# Patient Record
Sex: FEMALE | Race: WHITE | NOT HISPANIC OR LATINO | Employment: FULL TIME
[De-identification: names, ages, dates, MRNs, and addresses within clinical notes are randomized per-mention and may not be internally consistent; named-entity substitution may affect disease eponyms.]

---

## 2020-11-29 ENCOUNTER — HEALTH MAINTENANCE LETTER (OUTPATIENT)
Age: 35
End: 2020-11-29

## 2021-07-16 ENCOUNTER — RECORDS - HEALTHEAST (OUTPATIENT)
Dept: ADMINISTRATIVE | Facility: CLINIC | Age: 36
End: 2021-07-16

## 2021-09-01 ENCOUNTER — LAB REQUISITION (OUTPATIENT)
Dept: LAB | Facility: CLINIC | Age: 36
End: 2021-09-01

## 2021-09-01 PROCEDURE — 86481 TB AG RESPONSE T-CELL SUSP: CPT | Performed by: INTERNAL MEDICINE

## 2021-09-03 LAB
GAMMA INTERFERON BACKGROUND BLD IA-ACNC: 0.03 IU/ML
M TB IFN-G BLD-IMP: NEGATIVE
M TB IFN-G CD4+ BCKGRND COR BLD-ACNC: 9.97 IU/ML
MITOGEN IGNF BCKGRD COR BLD-ACNC: 0.01 IU/ML
MITOGEN IGNF BCKGRD COR BLD-ACNC: 0.02 IU/ML
QUANTIFERON MITOGEN: 10 IU/ML
QUANTIFERON NIL TUBE: 0.03 IU/ML
QUANTIFERON TB1 TUBE: 0.05 IU/ML
QUANTIFERON TB2 TUBE: 0.04

## 2021-09-25 ENCOUNTER — HEALTH MAINTENANCE LETTER (OUTPATIENT)
Age: 36
End: 2021-09-25

## 2021-11-28 ENCOUNTER — APPOINTMENT (OUTPATIENT)
Dept: URGENT CARE | Facility: URGENT CARE | Age: 36
End: 2021-11-28

## 2021-11-28 ENCOUNTER — LAB REQUISITION (OUTPATIENT)
Dept: LAB | Facility: CLINIC | Age: 36
End: 2021-11-28

## 2021-11-28 PROCEDURE — U0003 INFECTIOUS AGENT DETECTION BY NUCLEIC ACID (DNA OR RNA); SEVERE ACUTE RESPIRATORY SYNDROME CORONAVIRUS 2 (SARS-COV-2) (CORONAVIRUS DISEASE [COVID-19]), AMPLIFIED PROBE TECHNIQUE, MAKING USE OF HIGH THROUGHPUT TECHNOLOGIES AS DESCRIBED BY CMS-2020-01-R: HCPCS | Performed by: INTERNAL MEDICINE

## 2021-11-29 LAB — SARS-COV-2 RNA RESP QL NAA+PROBE: NEGATIVE

## 2022-01-15 ENCOUNTER — HEALTH MAINTENANCE LETTER (OUTPATIENT)
Age: 37
End: 2022-01-15

## 2022-08-11 PROBLEM — Z97.5 IUD (INTRAUTERINE DEVICE) IN PLACE: Status: ACTIVE | Noted: 2017-08-28

## 2022-08-11 PROBLEM — H91.90 HEARING LOSS: Status: ACTIVE | Noted: 2019-05-09

## 2022-09-19 ENCOUNTER — OFFICE VISIT (OUTPATIENT)
Dept: OBGYN | Facility: CLINIC | Age: 37
End: 2022-09-19
Payer: COMMERCIAL

## 2022-09-19 VITALS
SYSTOLIC BLOOD PRESSURE: 118 MMHG | DIASTOLIC BLOOD PRESSURE: 72 MMHG | BODY MASS INDEX: 33.19 KG/M2 | WEIGHT: 199.2 LBS | HEIGHT: 65 IN

## 2022-09-19 DIAGNOSIS — E66.09 CLASS 1 OBESITY DUE TO EXCESS CALORIES WITHOUT SERIOUS COMORBIDITY WITH BODY MASS INDEX (BMI) OF 33.0 TO 33.9 IN ADULT: ICD-10-CM

## 2022-09-19 DIAGNOSIS — F41.9 ANXIETY: ICD-10-CM

## 2022-09-19 DIAGNOSIS — Z30.432 ENCOUNTER FOR IUD REMOVAL: ICD-10-CM

## 2022-09-19 DIAGNOSIS — Z01.419 ENCOUNTER FOR GYNECOLOGICAL EXAMINATION (GENERAL) (ROUTINE) WITHOUT ABNORMAL FINDINGS: Primary | ICD-10-CM

## 2022-09-19 DIAGNOSIS — E66.811 CLASS 1 OBESITY DUE TO EXCESS CALORIES WITHOUT SERIOUS COMORBIDITY WITH BODY MASS INDEX (BMI) OF 33.0 TO 33.9 IN ADULT: ICD-10-CM

## 2022-09-19 DIAGNOSIS — M54.9 BACK PAIN, UNSPECIFIED BACK LOCATION, UNSPECIFIED BACK PAIN LATERALITY, UNSPECIFIED CHRONICITY: ICD-10-CM

## 2022-09-19 LAB
CLUE CELLS: NORMAL
ERYTHROCYTE [DISTWIDTH] IN BLOOD BY AUTOMATED COUNT: 12.4 % (ref 10–15)
HCT VFR BLD AUTO: 42.5 % (ref 35–47)
HGB BLD-MCNC: 14.5 G/DL (ref 11.7–15.7)
MCH RBC QN AUTO: 29.7 PG (ref 26.5–33)
MCHC RBC AUTO-ENTMCNC: 34.1 G/DL (ref 31.5–36.5)
MCV RBC AUTO: 87 FL (ref 78–100)
PLATELET # BLD AUTO: 267 10E3/UL (ref 150–450)
RBC # BLD AUTO: 4.88 10E6/UL (ref 3.8–5.2)
TRICHOMONAS, WET PREP: NORMAL
WBC # BLD AUTO: 7.1 10E3/UL (ref 4–11)
WBC'S/HIGH POWER FIELD, WET PREP: NORMAL
YEAST, WET PREP: NORMAL

## 2022-09-19 PROCEDURE — 85027 COMPLETE CBC AUTOMATED: CPT | Performed by: FAMILY MEDICINE

## 2022-09-19 PROCEDURE — 99385 PREV VISIT NEW AGE 18-39: CPT | Mod: 25 | Performed by: FAMILY MEDICINE

## 2022-09-19 PROCEDURE — 80053 COMPREHEN METABOLIC PANEL: CPT | Performed by: FAMILY MEDICINE

## 2022-09-19 PROCEDURE — 36415 COLL VENOUS BLD VENIPUNCTURE: CPT | Performed by: FAMILY MEDICINE

## 2022-09-19 PROCEDURE — 84443 ASSAY THYROID STIM HORMONE: CPT | Performed by: FAMILY MEDICINE

## 2022-09-19 PROCEDURE — 87624 HPV HI-RISK TYP POOLED RSLT: CPT | Performed by: FAMILY MEDICINE

## 2022-09-19 PROCEDURE — 58301 REMOVE INTRAUTERINE DEVICE: CPT | Performed by: FAMILY MEDICINE

## 2022-09-19 PROCEDURE — G0145 SCR C/V CYTO,THINLAYER,RESCR: HCPCS | Performed by: FAMILY MEDICINE

## 2022-09-19 PROCEDURE — 87210 SMEAR WET MOUNT SALINE/INK: CPT | Performed by: FAMILY MEDICINE

## 2022-09-19 PROCEDURE — 87591 N.GONORRHOEAE DNA AMP PROB: CPT | Performed by: FAMILY MEDICINE

## 2022-09-19 PROCEDURE — 87491 CHLMYD TRACH DNA AMP PROBE: CPT | Performed by: FAMILY MEDICINE

## 2022-09-19 PROCEDURE — 80061 LIPID PANEL: CPT | Performed by: FAMILY MEDICINE

## 2022-09-19 RX ORDER — GABAPENTIN 300 MG/1
300 CAPSULE ORAL
COMMUNITY
Start: 2020-05-01 | End: 2022-09-19

## 2022-09-19 RX ORDER — PHENTERMINE HYDROCHLORIDE 37.5 MG/1
37.5 TABLET ORAL
Qty: 90 TABLET | Refills: 0 | Status: SHIPPED | OUTPATIENT
Start: 2022-09-19 | End: 2023-02-23

## 2022-09-19 RX ORDER — GABAPENTIN 300 MG/1
300 CAPSULE ORAL
Qty: 30 CAPSULE | Refills: 1 | Status: SHIPPED | OUTPATIENT
Start: 2022-09-19

## 2022-09-19 RX ORDER — HYDROXYZINE HYDROCHLORIDE 25 MG/1
25 TABLET, FILM COATED ORAL 2 TIMES DAILY PRN
Qty: 30 TABLET | Refills: 1 | Status: SHIPPED | OUTPATIENT
Start: 2022-09-19

## 2022-09-19 RX ORDER — ZOLPIDEM TARTRATE 5 MG/1
5 TABLET ORAL
Qty: 30 TABLET | Refills: 0 | Status: SHIPPED | OUTPATIENT
Start: 2022-09-19 | End: 2022-11-17

## 2022-09-19 RX ORDER — HYDROXYZINE HYDROCHLORIDE 25 MG/1
TABLET, FILM COATED ORAL
COMMUNITY
Start: 2020-05-01 | End: 2022-09-19

## 2022-09-19 NOTE — PROGRESS NOTES
"SUBJECTIVE:  Rosaura Rosales is an 37 year old No obstetric history on file. woman who presents for   annual gyn exam. No LMP recorded. (Menstrual status: IUD). Periods are none due to IUD, lasting   no days. Dysmenorrhea:none. Cyclic symptoms   include none. No intermenstrual bleeding,   spotting, or discharge.  Menarche age [age].  STD hx: Chlamydia .    Current contraception: Mirena IUD  TERE exposure: no  History of abnormal Pap smear: Yes: now normal,   Treated with colposcopy.    Family history of uterine or ovarian cancer: No  Regular self breast exam: Yes  History of abnormal mammogram: No  Family history of breast cancer: Yes: PGM   History of abnormal lipids: No    No past medical history on file.     No family history on file.    No past surgical history on file.    Current Outpatient Medications   Medication     gabapentin (NEURONTIN) 300 MG capsule     hydrOXYzine (ATARAX) 25 MG tablet     levonorgestrel (MIRENA) 20 MCG/DAY IUD     zolpidem (AMBIEN) 5 MG tablet     No current facility-administered medications for this visit.     No Known Allergies    Social History     Tobacco Use     Smoking status: Never Smoker     Smokeless tobacco: Never Used   Substance Use Topics     Alcohol use: Not on file       Review Of Systems  Ears/Nose/Throat: negative  Respiratory: No shortness of breath, dyspnea on exertion, cough, or hemoptysis  Cardiovascular: negative  Gastrointestinal: negative  Genitourinary: See HPI   Constitutional, HEENT, cardiovascular, pulmonary, GI, , musculoskeletal, neuro, skin, endocrine and psych systems are negative, except as otherwise noted.      OBJECTIVE:  /72   Ht 1.651 m (5' 5\")   Wt 90.4 kg (199 lb 3.2 oz)   BMI 33.15 kg/m      General appearance: healthy, alert and no distress  Skin: Skin color, texture, turgor normal. No rashes or lesions.  Ears: negative  Nose/Sinuses: Nares normal. Septum midline. Mucosa normal. No drainage or sinus tenderness.  Oropharynx: Lips, " mucosa, and tongue normal. Teeth and gums normal.  Neck: Neck supple. No adenopathy. Thyroid symmetric, normal size,, Carotids without bruits.  Lungs: negative, Percussion normal. Good diaphragmatic excursion. Lungs clear  Heart: negative, PMI normal. No lifts, heaves, or thrills. RRR. No murmurs, clicks gallops or rub  Breasts: Inspection negative. No nipple discharge or bleeding. No masses.  Abdomen: Abdomen soft, non-tender. BS normal. No masses, organomegaly  Pelvic: Pelvic:  Pelvic examination with  Pap/yes wet prep yes Gonorrhea and Chlamydia   including  External genitalia normal   and vagina normal rugatted no atrophic  Examination of urethra  normal no masses, tenderness, scarring  bladder, no masses or tenderness  Cervix  no lesions or discharge  Bimanual exam with   Uterus 6 weeks size, mid position, mobile,non-tenderness, normal no descent   Adnexa/parametria  Normal no masses     Procedure:  Strings grasped with ring forceps and IUD easily removed, intact.   Patient tolerated well, no complications.          ASSESSMENT:  Rosaura Rosales is an 37 year old  woman who presents for   annual gyn exam. Concerns:  Desires IUD removal    PLAN:  Dx:  1)  Pap smear complete  Gonorrhea  Chlamydia completed   2)  Mammography not due , lipids at appropriate intervals today   Colonoscopy due age 46   3)  Covid-19 concerns: covid infection and vaccination recommendations discussed.   4)  Contraception: IUD removal, partner with vasectomy  5)  Weight gain, Class 1 Obesity: referral to weight management center  Would like to start phentermine in the meantime.       PE:  Reviewed health maintenance including diet, regular exercise   and periodic exams.    Dr. Mayte Atwood, DO    Obstetrics and Gynecology  [facility] [place]

## 2022-09-19 NOTE — NURSING NOTE
"Chief Complaint   Patient presents with     IUD     Remove Mirena IUD       Initial /72   Ht 1.651 m (5' 5\")   Wt 90.4 kg (199 lb 3.2 oz)   BMI 33.15 kg/m   Estimated body mass index is 33.15 kg/m  as calculated from the following:    Height as of this encounter: 1.651 m (5' 5\").    Weight as of this encounter: 90.4 kg (199 lb 3.2 oz).  BP completed using cuff size: large    Questioned patient about current smoking habits.  Pt. has never smoked.      No obstetric history on file.    The following HM Due: pap smear  Edwin (13-24)      "

## 2022-09-19 NOTE — PATIENT INSTRUCTIONS
Return yearly     Dr. Mayte Atwood, DO    Obstetrics and Gynecology  [facility] [place] [facility] [place]

## 2022-09-20 LAB
ALBUMIN SERPL-MCNC: 4.1 G/DL (ref 3.4–5)
ALP SERPL-CCNC: 52 U/L (ref 40–150)
ALT SERPL W P-5'-P-CCNC: 37 U/L (ref 0–50)
ANION GAP SERPL CALCULATED.3IONS-SCNC: 7 MMOL/L (ref 3–14)
AST SERPL W P-5'-P-CCNC: 14 U/L (ref 0–45)
BILIRUB SERPL-MCNC: 0.5 MG/DL (ref 0.2–1.3)
BUN SERPL-MCNC: 12 MG/DL (ref 7–30)
C TRACH DNA SPEC QL NAA+PROBE: NEGATIVE
CALCIUM SERPL-MCNC: 8.9 MG/DL (ref 8.5–10.1)
CHLORIDE BLD-SCNC: 105 MMOL/L (ref 94–109)
CHOLEST SERPL-MCNC: 237 MG/DL
CO2 SERPL-SCNC: 24 MMOL/L (ref 20–32)
CREAT SERPL-MCNC: 0.73 MG/DL (ref 0.52–1.04)
FASTING STATUS PATIENT QL REPORTED: YES
GFR SERPL CREATININE-BSD FRML MDRD: >90 ML/MIN/1.73M2
GLUCOSE BLD-MCNC: 88 MG/DL (ref 70–99)
HDLC SERPL-MCNC: 48 MG/DL
LDLC SERPL CALC-MCNC: 142 MG/DL
N GONORRHOEA DNA SPEC QL NAA+PROBE: NEGATIVE
NONHDLC SERPL-MCNC: 189 MG/DL
POTASSIUM BLD-SCNC: 4 MMOL/L (ref 3.4–5.3)
PROT SERPL-MCNC: 7.6 G/DL (ref 6.8–8.8)
SODIUM SERPL-SCNC: 136 MMOL/L (ref 133–144)
TRIGL SERPL-MCNC: 235 MG/DL
TSH SERPL DL<=0.005 MIU/L-ACNC: 1.88 MU/L (ref 0.4–4)

## 2022-09-21 LAB
BKR LAB AP GYN ADEQUACY: NORMAL
BKR LAB AP GYN INTERPRETATION: NORMAL
BKR LAB AP GYN OTHER FINDINGS: NORMAL
BKR LAB AP HPV REFLEX: NORMAL
BKR LAB AP PREVIOUS ABNORMAL: NORMAL
PATH REPORT.COMMENTS IMP SPEC: NORMAL
PATH REPORT.COMMENTS IMP SPEC: NORMAL
PATH REPORT.RELEVANT HX SPEC: NORMAL

## 2022-09-23 LAB
HUMAN PAPILLOMA VIRUS 16 DNA: NEGATIVE
HUMAN PAPILLOMA VIRUS 18 DNA: NEGATIVE
HUMAN PAPILLOMA VIRUS FINAL DIAGNOSIS: NORMAL
HUMAN PAPILLOMA VIRUS OTHER HR: NEGATIVE

## 2022-11-10 DIAGNOSIS — F41.9 ANXIETY: ICD-10-CM

## 2022-11-10 NOTE — TELEPHONE ENCOUNTER
Routing refill request to provider for review/approval because:  Drug not on the [facility] refill protocol   Trinity Hutchinson RN

## 2022-11-17 RX ORDER — ZOLPIDEM TARTRATE 5 MG/1
TABLET ORAL
Qty: 30 TABLET | Refills: 0 | Status: SHIPPED | OUTPATIENT
Start: 2022-11-17

## 2022-11-18 ENCOUNTER — TELEPHONE (OUTPATIENT)
Dept: OBGYN | Facility: CLINIC | Age: 37
End: 2022-11-18

## 2022-11-18 DIAGNOSIS — F41.9 ANXIETY: ICD-10-CM

## 2022-11-18 RX ORDER — ZOLPIDEM TARTRATE 5 MG/1
5 TABLET ORAL
Qty: 30 TABLET | Refills: 0 | Status: CANCELLED | OUTPATIENT
Start: 2022-11-18

## 2022-11-18 NOTE — TELEPHONE ENCOUNTER
Hello,     Insurance only will pay for #15 tablets per 23 days. Please contact insurance to get a prior authorization for one daily.    Please do not close this encounter until this has been addressed.  (prior auth approved/denied, prescriber refusal to complete prior auth or medication changed/discontinued)    Prior Authorization needed on: Zolpidem 5mg  Drug NDC: [address]-60    Insurance: [facility]  Member ID: [id number]  Insurance phone #: [phone]    Pharmacy NPI:[id number]  Pharmacy Phone #: [phone]  Pharmacy Fax #: [phone]    Please let us know if the PA gets approved or denied or if medication is changed    Thank you,  [facility] Staff Technician   [facility]  [email or web address]   #: [phone]  Fax#: [phone]

## 2022-11-21 NOTE — TELEPHONE ENCOUNTER
Central Prior Authorization Team   Phone: [phone]      PA Initiation    Medication: Zolpidem  Insurance Company: [employer] - Phone [phone] Fax [phone]  Pharmacy Filling the Rx: [place], [place] - [address]  Filling Pharmacy Phone: [phone]  Filling Pharmacy Fax:    Start Date: 11/21/2022

## 2022-11-21 NOTE — TELEPHONE ENCOUNTER
Prior Authorization Approval    Authorization Effective Date: 11/21/2022  Authorization Expiration Date: 11/21/2023  Medication: Zolpidem-APPROVED  Approved Dose/Quantity:   Reference #:     Insurance Company: [employer] - Phone [phone] Fax [phone]  Expected CoPay:       CoPay Card Available:      Foundation Assistance Needed:    Which Pharmacy is filling the prescription (Not needed for infusion/clinic administered): [facility] [place], [place] - [address] [facility] [address]  Pharmacy Notified: Yes  Patient Notified: No  **Instructed pharmacy to notify patient when script is ready to /ship.**

## 2022-12-13 ENCOUNTER — E-VISIT (OUTPATIENT)
Dept: URGENT CARE | Facility: CLINIC | Age: 37
End: 2022-12-13
Payer: COMMERCIAL

## 2022-12-13 DIAGNOSIS — N39.0 ACUTE UTI (URINARY TRACT INFECTION): Primary | ICD-10-CM

## 2022-12-13 PROCEDURE — 99421 OL DIG E/M SVC 5-10 MIN: CPT | Performed by: EMERGENCY MEDICINE

## 2022-12-13 RX ORDER — NITROFURANTOIN 25; 75 MG/1; MG/1
100 CAPSULE ORAL 2 TIMES DAILY
Qty: 10 CAPSULE | Refills: 0 | Status: SHIPPED | OUTPATIENT
Start: 2022-12-13 | End: 2022-12-18

## 2022-12-13 NOTE — PATIENT INSTRUCTIONS
Dear Rosaura Rosales    After reviewing your responses, I've been able to diagnose you with a urinary tract infection, which is a common infection of the bladder with bacteria.  This is not a sexually transmitted infection, though urinating immediately after intercourse can help prevent infections.  Drinking lots of fluids is also helpful to clear your current infection and prevent the next one.      I have sent a prescription for antibiotics to your pharmacy to treat this infection.    It is important that you take all of your prescribed medication even if your symptoms are improving after a few doses.  Taking all of your medicine helps prevent the symptoms from returning.     If your symptoms worsen, you develop pain in your back or stomach, develop fevers, or are not improving in 5 days, please contact your primary care provider for an appointment or visit any of our convenient Walk-in or Urgent Care Centers to be seen, which can be found on our website here.    Thanks again for choosing us as your health care partner,    David Villafuerte MD    Urinary Tract Infections in Women  Urinary tract infections (UTIs) are most often caused by bacteria. These bacteria enter the urinary tract. The bacteria may come from inside the body. Or they may travel from the skin outside the rectum or vagina into the urethra. Female anatomy makes it easy for bacteria from the bowel to enter a woman s urinary tract, which is the most common source of UTI. This means women develop UTIs more often than men. Pain in or around the urinary tract is a common UTI symptom. But the only way to know for sure if you have a UTI for the healthcare provider to test your urine. The two tests that may be done are the urinalysis and urine culture.     Types of UTIs    Cystitis. A bladder infection (cystitis) is the most common UTI in women. You may have urgent or frequent need to pee. You may also have pain, burning when you pee, and bloody  urine.    Urethritis. This is an inflamed urethra, which is the tube that carries urine from the bladder to outside the body. You may have lower stomach or back pain. You may also have urgent or frequent need to pee.    Pyelonephritis. This is a kidney infection. If not treated, it can be serious and damage your kidneys. In severe cases, you may need to stay in the hospital. You may have a fever and lower back pain.    Medicines to treat a UTI  Most UTIs are treated with antibiotics. These kill the bacteria. The length of time you need to take them depends on the type of infection. It may be as short as 3 days. If you have repeated UTIs, you may need a low-dose antibiotic for several months. Take antibiotics exactly as directed. Don t stop taking them until all of the medicine is gone. If you stop taking the antibiotic too soon, the infection may not go away. You may also develop a resistance to the antibiotic. This can make it much harder to treat.   Lifestyle changes to treat and prevent UTIs   The lifestyle changes below will help get rid of your UTI. They may also help prevent future UTIs.     Drink plenty of fluids. This includes water, juice, or other caffeine-free drinks. Fluids help flush bacteria out of your body.    Empty your bladder. Always empty your bladder when you feel the urge to pee. And always pee before going to sleep. Urine that stays in your bladder can lead to infection. Try to pee before and after sex as well.    Practice good personal hygiene. Wipe yourself from front to back after using the toilet. This helps keep bacteria from getting into the urethra.    Use condoms during sex. These help prevent UTIs caused by sexually transmitted bacteria. Also don't use spermicides during sex. These can increase the risk for UTIs. Choose other forms of birth control instead. For women who tend to get UTIs after sex, a low-dose of a preventive antibiotic may be used. Be sure to discuss this option with  your healthcare provider.    Follow up with your healthcare provider as directed. He or she may test to make sure the infection has cleared. If needed, more treatment may be started.  Anita last reviewed this educational content on 7/1/2019 2000-2021 [employer]. All rights reserved. This information is not intended as a substitute for professional medical care. Always follow your healthcare professional's instructions.

## 2022-12-30 ENCOUNTER — TELEPHONE (OUTPATIENT)
Dept: ENDOCRINOLOGY | Facility: CLINIC | Age: 37
End: 2022-12-30

## 2022-12-30 ENCOUNTER — VIRTUAL VISIT (OUTPATIENT)
Dept: ENDOCRINOLOGY | Facility: CLINIC | Age: 37
End: 2022-12-30
Payer: COMMERCIAL

## 2022-12-30 VITALS — WEIGHT: 180 LBS | BODY MASS INDEX: 29.99 KG/M2 | HEIGHT: 65 IN

## 2022-12-30 DIAGNOSIS — E78.5 DYSLIPIDEMIA: ICD-10-CM

## 2022-12-30 DIAGNOSIS — E66.3 OVERWEIGHT WITH BODY MASS INDEX (BMI) OF 29 TO 29.9 IN ADULT: ICD-10-CM

## 2022-12-30 DIAGNOSIS — Z71.3 NUTRITIONAL COUNSELING: Primary | ICD-10-CM

## 2022-12-30 DIAGNOSIS — E66.3 OVERWEIGHT WITH BODY MASS INDEX (BMI) OF 29 TO 29.9 IN ADULT: Primary | ICD-10-CM

## 2022-12-30 PROCEDURE — 99204 OFFICE O/P NEW MOD 45 MIN: CPT | Mod: 95 | Performed by: NURSE PRACTITIONER

## 2022-12-30 PROCEDURE — 97802 MEDICAL NUTRITION INDIV IN: CPT | Mod: 95 | Performed by: DIETITIAN, REGISTERED

## 2022-12-30 RX ORDER — SEMAGLUTIDE 0.25 MG/.5ML
0.25 INJECTION, SOLUTION SUBCUTANEOUS
Qty: 2 ML | Refills: 0 | Status: SHIPPED | OUTPATIENT
Start: 2022-12-30 | End: 2023-02-23

## 2022-12-30 RX ORDER — SEMAGLUTIDE 0.5 MG/.5ML
0.5 INJECTION, SOLUTION SUBCUTANEOUS
Qty: 2 ML | Refills: 1 | Status: SHIPPED | OUTPATIENT
Start: 2022-12-30 | End: 2023-02-23

## 2022-12-30 ASSESSMENT — PAIN SCALES - GENERAL: PAINLEVEL: NO PAIN (0)

## 2022-12-30 NOTE — PATIENT INSTRUCTIONS
"Thank you for allowing us the privilege of caring for you. We hope we provided you with the excellent service you deserve.   Please let us know if there is anything else we can do for you so that we can be sure you are completely satisfied with your care experience.    To ensure the quality of our services you may be receiving a patient satisfaction survey from an independent patient satisfaction monitoring company.    The greatest compliment you can give is a \"Likely to Recommend\"    Your visit was with Marialuisa Montoya NP today.    Instructions per today's visit:     Harjinder Rosales, it was great to visit with you today.  Here is a review of our visit.  If our clinic scheduler is not able to reach you please call [phone] to schedule your next appointments.    -start wegovy  -work on smaller portions to start  -work with dietitian  -oDt will call about information about health coaching and 24 week plan- more information below  -consider trying the podcast \"nothing much happens\"  -consider trying alternative to ambien  -follow up 2 months       Information about Video Visits with [facility]: video visit information  _________________________________________________________________________________________________________________________________________________________  If you are asked by your clinic team to have your blood pressure checked:  [facility] do offer several locations for blood pressure checks. Please follow the below link to schedule an appointment. Scheduling an appointment at the pharmacy for a blood pressure check is now preferred.    Appointment Plus (appointment-plus.[address])  _________________________________________________________________________________________________________________________________________________________  Important contact and scheduling information:  Please call our contact center at [phone] to schedule your next appointments.  To find a lab location near " you, please call [phone].  For any nursing questions or concerns call Candie Youssef LPN at [phone] or Mary Canchola RN at [phone]  Please call during clinic hours Monday through Friday 8:00a - 4:00p if you have questions or you can contact us via Dream[employer] at anytime and we will reply during clinic hours.    Lab results will be communicated through My Chart or letter (if My Chart not used). Please call the clinic if you have not received communication after 1 week or if you have any questions.?  Clinic Fax: [phone]    _________________________________________________________________________________________________________________________________________________________  Meal Replacement Products:    Here is the link to our new e-store where you can purchase our meal replacement products    [facility] E-Store  [email or web address]    The one week starter kit is a great way to sample a variety of products and see what works for you.    If you want more information about the product go to: Fresh Steps Meals  [employer]    If you are an employee or [facility] or [facility] please contact your care team for a 10% estore discount    Free Shipping for orders over $75     Benefits of meal replacements products:    Portion and calorie control  Improved nutrition  Structured eating  Simplified food choices  Avoid contact with trigger foods  _________________________________________________________________________________________________________________________________________________________  Interested in working with a health ?  Health coaches work with you to improve your overall health and wellbeing.  They look at the whole person, and may involve discussion of different areas of life, including, but not limited to the four pillars of health (sleep, exercise, nutrition, and stress management). Discuss with your care team if you would like to start  working a health .  Health Coaching-3 Pack: Schedule by calling [phone]    $99 for three health coaching visits    Visits may be done in person or via phone    Coaching is a partnership between the  and the client; Coaches do not prescribe or diagnose    Coaching helps inspire the client to reach his/her personal goals   _________________________________________________________________________________________________________________________________________________________  24 Week Healthy Lifestyle Plan:    Our mission in the 24-week Healthy Lifestyle Plan is to provide you with individualized care by giving you the tools, education and support you need to lose weight and maintain a healthy lifestyle. In your 24-week journey, you ll be supported by a dedicated weight loss team that includes registered dietitians, medical weight management providers, health coaches, and nurses -- all with special expertise in weight loss -- to help you every step of the way.     Monthly meetings with your registered dietician or medical weight management provider help to review your progress, update your care plan, and make any adjustments needed to ensure success. Between these visits, weekly and bi-weekly health  visits will help you focus on the four pillars of weight loss -- stress, sleep, nutrition, and exercise -- and how you can best adapt each to achieve sustainable weight loss results.    In addition, you will be given exclusive access to online wellbeing classes through [employer].  Your initial visit will be with a medical weight management provider who will help to understand your weight loss goals and ensure this program is the right fit for you. Please let our team know if you are interested in the 24 week plan by sending a message to your care team or calling [phone] to  "schedule.  _________________________________________________________________________________________________________________________________________________________    COMPREHENSIVE WEIGHT MANAGEMENT PROGRAM  VIRTUAL SUPPORT GROUPS    For Support Group Information:      We offer support groups for patients who are working on weight loss and considering, preparing for or have had weight loss surgery.   There is no cost for this opportunity.  You are invited to attend the?Virtual Support Groups?provided by any of the following locations:    [facility] via Microsoft Teams with Park Davalso RN  2.   [place] via emere with Romie Velásquez, PhD, LP  3.   [place] via emere with Dot Monge RN  4.   [facility] via Matches Fashion Teams with Dot Orellana [facility]    The following Support Group information can also be found on our website:  [email or web address]    [facility] Weight Loss Surgery Support Group    [facility] Weight Loss Surgery Support Group  The support group is a patient-lead forum that meets monthly to share experiences, encouragement and education. It is open to those who have had weight loss surgery, are scheduled for surgery, and those who are considering surgery.   WHEN: This group meets on the 3rd Wednesday of each month from 5:00PM - 6:00PM virtually using Microsoft Teams.   FACILITATOR: Led by Park Davalos, SEAMUS, LD, RN, the program's Clinical Coordinator.   TO REGISTER: Please contact the clinic via Raffstar or call the nurse line directly at [phone] to inform our staff that you would like an invite sent to you and to let us know the email you would like the invite sent to. Prior to the meeting, a link with directions on how to join the meeting will be sent to you.    2022 Meetings  Deepika 15: \"Let's Talk\" a time for the group to share.  July 20: \"Let's Talk\" a time for the group to " "share.  August 17: \"Let's Talk\" a time for the group to share.  September 21: \"Let's Talk\" a time for the group to share.  October 19: Guest Speaker: Dr Bernardino Pascual MD Pulmonologist and Sleep Medicine Physician, \"Getting a Good Night's Sleep\".  November 16: \"Let's Talk\" a time for the group to share.  December 21: \"Let's Talk\" a time for the group to share.    [facility] and [facility] Support Groups    Connections: Bariatric Care Support Group?  This is open to all [facility] (and those external to this program) pre- and post- operative bariatric surgery patients as well as their support system.   WHEN: This group meets the 2nd Tuesday of each month from 6:30 PM - 8:00 PM virtually using Microsoft Teams.   FACILITATOR: Led by Romie Velásquez, Ph.D who is a Licensed Psychologist with the [facility] Comprehensive Weight Management Program.   TO REGISTER: Please send an email to Romie Velásquez, Ph.D., LP at?[email or web address]?if you would like an invitation to the group and to learn about using Microsoft Teams.    2022 Meetings  June 14: Margarette Calvin RD, LD at [facility], \"Nutritional Labeling\"  July 12 August 2 (Please Note Date Change)  September 13 October 11 November 8 December 13    Connections: Post-Operative Bariatric Surgery Support Group  This is a support group for [facility] bariatric patients (and those external to [facility]) who have had bariatric surgery and are at least 3 months post-surgery.  WHEN: This support group meets the 4th Wednesday of the month from 11:00 AM - 12:00 PM virtually using Microsoft Teams.   FACILITATOR: Led by Certified Bariatric Nurse, Dot Monge RN.   TO REGISTER: Please send an email to Dot at [email or web address] if you would like an invitation to the group and to learn about using Microsoft Teams.    2022 Meetings June 22 July 27 August 24 September 28 October 26 November 23 December " "28      [facility] Healthy Lifestyle Virtual Support Group    Healthy Lifestyle Virtual Support Group?  This is 60 minutes of small group guided discussion, support and resources. All are welcome who want a healthy lifestyle.  WHEN: This group meets monthly on a Friday from 12:30 PM - 1:30 PM virtually using Microsoft Teams.   FACILITATOR: Led by National Board Certified Health and , Dot Orellana [facility].   TO REGISTER: Please send an email to Dot [email or web address] to receive monthly invites to the group or if you have any questions about having a health .  Prior to the meeting, a link with directions on how to join the meeting will be sent to you.    2022 Meetings  June 24: Dot Orellana [facility], \"Setting Limits and Boundaries\".  Jul 29: Open Forum  August 26: Guest Speaker: Agnieszka Gallagher Registered Dietitian  September 30: Open Forum  October 28th: Guest Speaker: Eun Fernando [facility], Health , \"Gratitude Practices\".  November 18: Guest Speaker: Audelia Kirby RD Registered Dietitian, \"Navigating How to Eat around the Holidays\".  December 16: Guest Speaker: Nikki Duarte [facility], \"Changing Your Relationship with Movement\".    ____________________________________________________________________________________________________________________________________________________________________________  [facility] Get Moving Program  Our team of physical therapists is trained to help you understand and take control of your condition. They will perform a thorough evaluation to determine your ability for activity and develop a customized plan to fit your goals and physical ability.  Scheduling: Unsure if the Get Moving program is right for you? Discuss the program with your medical provider or diabetes educator. You can also call us at [phone] to ask questions or schedule an appointment.   [facility] Get Moving " Program  ____________________________________________________________________________________________________________________________________________________________________________  [facility] Diabetes Prevention Program (DPP)  If you have prediabetes and Medicare please contact us via Coherent Patht to learn more about the Diabetes Prevention Program (DPP)  Program Details:  [facility] offers the year-long Diabetes Prevention Program (DPP). The program helps you to make lifestyle changes that prevent or delay type 2 diabetes by supporting healthy eating, increased physical activity, stress reduction and use of coping skills.   On average, previous [facility] DPP cohorts have lost and maintained at least 5% of their starting weight throughout the program and averaged more than 150 minutes of physical activity per week.  Participants meet weekly for one-hour group sessions over sixteen weeks, every other week for the next 8 weeks, and monthly for the last six months.   A year-long maintenance program is also available for participants who complete the first year.   Location & Cost:   During the COVID-19 Public Health Emergency, the program is offered virtually. When in-person classes can resume, they will be held at [facility].  For people with Medicare, the program is covered in full. A self-pay option will also be available for those with non-Medicare insurance plans.   _______________________________To work with a Behavioral Health Psychologist:    Call to schedule:    Terence Vernon - [phone]  Jose Ha - [phone]  Chikis Patel - [phone]  Tonya Maddox - [phone]   Kaylee Nolasco PhD (cannot accept Medicare) [phone]        Thank you,   [facility] Comprehensive Weight Management Team

## 2022-12-30 NOTE — ASSESSMENT & PLAN NOTE
"Adult onset obesity after having daughter 11 years ago. Now, with elevated cholesterol and back pain wanting to consider more sustainable weight management options. Started phentermine with OB 9/2022 and has lost about 20lb since that time. Phentermine causing some jitteriness initially but more tolerable now.     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 Takes ambien as needed but this causes night time eating. Insomnia not worse with phentermine as long as she takes early enough in the day. Some days hungrier than others. Doesn't associate with large portions. Some stress eating/ emotional eating. We discussed switching to combination of phentermine/ topiramate (qsymia) vs starting wegovy. Given some side effects with phentermine, chose wegovy first. No hx pancreatitis. No personal or family hx MTC or MEN2. No reflux.      Has been intermittent fasting since September. Some anticipation of fasting and then eating more to compensate. Doesn't feel sustainable long term. Discussed working to find something more sustainable with the dietitian.     -start wegovy  -work on smaller portions to start  -work with dietitian  -Dot will call about information about health coaching and 24 week plan- more information below  -consider trying the podcast \"nothing much happens\"  -consider trying alternative to ambien  -follow up 2 months   "

## 2022-12-30 NOTE — NURSING NOTE
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     Fatigue     Carpal Tunnel Syndrome     Mild Cervical Dysplasia (LAURA I)     IUD (intrauterine device) in place     Circadian rhythm sleep disorder     Anxiety     Hearing loss     Mild episode of recurrent major depressive disorder (H)    )  (   Current Outpatient Medications   Medication Sig Dispense Refill     gabapentin (NEURONTIN) 300 MG capsule Take 1 capsule (300 mg) by mouth nightly as needed for neuropathic pain 30 capsule 1     hydrOXYzine (ATARAX) 25 MG tablet Take 1 tablet (25 mg) by mouth 2 times daily as needed for itching 30 tablet 1     phentermine (ADIPEX-P) 37.5 MG tablet Take 1 tablet (37.5 mg) by mouth every morning (before breakfast) 90 tablet 0     zolpidem (AMBIEN) 5 MG tablet TAKE 1 TABLET BY MOUTH NIGHTLY AS NEEDED FOR SLEEP 30 tablet 0     levonorgestrel (MIRENA) 20 MCG/DAY IUD       )  ( Diabetes Eval:    )    ( Pain Eval:  No Pain (0) )    ( Wound Eval:       )    (   History   Smoking Status     Never   Smokeless Tobacco     Never    )    ( Signed By:  Elizabeth Fuchs, EMT; December 30, 2022; 8:34 AM )    "

## 2022-12-30 NOTE — PATIENT INSTRUCTIONS
Resources/Considerations:  Could consider 14:10 IF if more sustainable right now    Eating hrs for example:  10 am - 8 pm    Could also consider replacing breakfast with a lower kcal protein shake then doing 2 regular meals.    Estimated recommended needs for weight loss:    ~ 1200 kcal/day (at sedentary lifestyle)    Carbohydrates: ~120 g/day  Fat: ~40 g/day  Protein: ~90 g/day    Other resources:  Plate method can be used for general guidance on balanced meals/portion sizes (see link below for picture/more information)                 Make 1/2 your plate non starchy vegetables (cauliflower, broccoli, asparagus, Hubert sprouts, lettuce, carrots, for example).                3+ oz lean protein sources (salmon/skinless chicken/turkey breast/pork loin/lean cuts of beef/ or non-animal protein such as black, kidney or villalobos beans, tofu/edamame/tempeh)     (Note: 3 oz = deck of cards size)                 1/2 to 1 cup carbohydrate choices such as whole grain starches or starchy vegetables or fruit. For example: quinoa, brown rice, barley, potatoes, sweet potatoes, winter squash, peas, corn, or fruit.               Choose ~0-2 added fat servings at a meal (avocados, nut butters, nuts or seeds, olive oil, vegetable oils).    The Plate Method:  https://www.cdc.gov/diabetes/images/managing/Diabetes-Manage-Eat-Well-Plate-Graphic_600px.jpg  - Initially designed for diabetes management but works well for weigh loss as well     Protein Sources   [email or web address]     Carbohydrates  [email or web address]     Mindful Eating  [email or web address]     Summary of Volumetrics Eating Plan  [email or web address]     Follow-Up:  JEVON Ying, RD, LD  Clinic #: [phone]

## 2022-12-30 NOTE — TELEPHONE ENCOUNTER
"Prior Authorization Retail Medication Request    Medication/Dose: Wegovy  ICD code (if different than what is on RX):    Overweight with body mass index (BMI) of 29 to 29.9 in adult [E66.3, Z68.29]  - Primary       Dyslipidemia [E78.5]             Previously Tried and Failed:  Phentermine, history of diet and exercise, Weight Watchers, Atkins-type Diet (Low Carb/High Protein), Medications, Meal Replacements    Rationale:  I had the pleasure of seeing your patient, Rosaura Rosales. Full intake/assessment was done to determine barriers to weight loss success and develop a treatment plan. Rosaura Rosales is a 37 year old female interested in treatment of medical problems associated with excess weight. She has a height of 5' 5\", a weight of 180 lbs 0 oz, and the calculated Body mass index is 29.95 kg/m .     Adult onset obesity after having daughter 11 years ago. Now, with elevated cholesterol and back pain wanting to consider more sustainable weight management options. Started phentermine with OB 9/2022 and has lost about 20lb since that time. Phentermine causing some jitteriness initially but more tolerable now.     Takes ambien as needed but this causes night time eating. Insomnia not worse with phentermine as long as she takes early enough in the day. Some days hungrier than others. Doesn't associate with large portions. Some stress eating/ emotional eating. We discussed switching to combination of phentermine/ topiramate (qsymia) vs starting wegovy. Given some side effects with phentermine, chose wegovy first. No hx pancreatitis. No personal or family hx MTC or MEN2. No reflux.     Insurance Name:    Insurance ID:        Pharmacy Information (if different than what is on RX)  Name:  [facility] [place], [place] [facility] [address] [address] [address]  Phone:  [phone]  "

## 2022-12-30 NOTE — LETTER
"12/30/2022       RE: Rosaura Rosales  [address]  [facility] [address]     Dear Colleague,    Thank you for referring your patient, Rosaura Rosales, to the [facility] [place] [facility]. Please see a copy of my visit note below.    Rosaura Rosales is a 37 year old female who is being evaluated via a billable video visit.      The patient has been notified of following:     \"This video visit will be conducted via a call between you and your physician/provider. We have found that certain health care needs can be provided without the need for an in-person physical exam.  This service lets us provide the care you need with a video conversation.  If a prescription is necessary we can send it directly to your pharmacy.  If lab work is needed we can place an order for that and you can then stop by our lab to have the test done at a later time.    Video visits are billed at different rates depending on your insurance coverage.  Please reach out to your insurance provider with any questions.    If during the course of the call the physician/provider feels a video visit is not appropriate, you will not be charged for this service.\"    Patient has given verbal consent for Video visit? Yes  How would you like to obtain your AVS? MyChart  If you are dropped from the video visit, the video invite should be resent to: Text to cell phone: [phone]  Will anyone else be joining your video visit? No  {If patient encounters technical issues they should call [phone]      Video-Visit Details    Type of service:  Video Visit    Video Start Time: 1:30 pm   Video End Time: 1:40 pm    Originating Location (pt. Location): Home    Distant Location (provider location):  Offsite (providers home)     Platform used for Video Visit: Boxfish    During this virtual visit the patient is located in [place], patient verifies this as the location during the " "entirety of this visit.     New Weight Management Nutrition Consultation    Rosaura Rosales is a 37 year old female presents today for new weight management nutrition consultation.  Patient referred by Marialuisa Montoya on December 30, 2022.    Patient with Co-morbidities of obesity including:  Type II DM no  Renal Failure no  Sleep apnea no  Hypertension no   Dyslipidemia yes, high chol  Joint pain no  Back pain yes  GERD no     PMH: fatigue    Anthropometrics:  Weight 12/30/22: 180 lbs with BMI 29.95    Weight hx per NP note:  Was 130 lbs at wedding 13 years ago   150 lbs at conception with daughter    Estimated body mass index is 29.95 kg/m  as calculated from the following:    Height as of an earlier encounter on 12/30/22: 1.651 m (5' 5\").    Weight as of an earlier encounter on 12/30/22: 81.6 kg (180 lb).       Medications for Weight Loss:  Wegovy prescribed 12/30/22    Has been on Phentermine since Sept 2022 - has lost 20 lbs since then.    Supplements:   Fish oil  Vit D   MVI     NUTRITION HISTORY  NKFA    Weight gain started after having her daughter.     Pt goals: manage weight, decrease chol, improve back pan     Feels knowable in nutrition but wants to see if anything else is helpful. Thought the appt today was required - let her know it is not. Patient said she is open to discussing nutrition but has no major questions/concerns.     Started Phentermine with OB in September. Also has been doing IF 16:8, does not feel sustainable long-term but wanted initial weight loss. Currently eats between 1-8 pm typically. 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 Lots of back pain. Wants to workout more     Considering 24 week but not sure, talked to Dot this morning     Additional information:   RN - shift " work    Daughter - 11 as of Dec 2022    Nutrition Prescription  Recommended energy/nutrient modification.    Nutrition Diagnosis  Obesity r/t long history of positive energy balance aeb BMI >30.    Nutrition Intervention  Reviewed current dietary habits and pts history   Discussed long-term goals pt hopes to accomplish in RD appointments  Answered pt questions  Coordination of care   Nutrition education - Activity, macro needs  AVS and handouts via [employer]    Patient demonstrates understanding.    Expected Engagement: good    Resources/Considerations:  Could consider 14:10 IF if more sustainable right now    Eating hrs for example:  10 am - 8 pm    Could also consider replacing breakfast with a lower kcal protein shake then doing 2 regular meals.    Estimated recommended needs for weight loss:    ~ 1200 kcal/day (at sedentary lifestyle)    Carbohydrates: ~120 g/day  Fat: ~40 g/day  Protein: ~90 g/day    Other resources:  Plate method can be used for general guidance on balanced meals/portion sizes (see link below for picture/more information)                 Make 1/2 your plate non starchy vegetables (cauliflower, broccoli, asparagus, [employer] sprouts, lettuce, carrots, for example).                3+ oz lean protein sources (salmon/skinless chicken/turkey breast/pork loin/lean cuts of beef/ or non-animal protein such as black, kidney or villalobos beans, tofu/edamame/tempeh)     (Note: 3 oz = deck of cards size)                 1/2 to 1 cup carbohydrate choices such as whole grain starches or starchy vegetables or fruit. For example: quinoa, brown rice, barley, potatoes, sweet potatoes, winter squash, peas, corn, or fruit.               Choose ~0-2 added fat servings at a meal (avocados, nut butters, nuts or seeds, olive oil, vegetable oils).    The Plate Method:  https://www.cdc.gov/diabetes/images/managing/Diabetes-Manage-Eat-Well-Plate-Graphic_600px.jpg  - Initially designed for diabetes management but works well for  weigh loss as well     Protein Sources   [email or web address]     Carbohydrates  [email or web address]     Mindful Eating  [email or web address]     Summary of Volumetrics Eating Plan  [email or web address]     Follow-Up:  PRN    Time spent with patient: 10 minutes.  JEVON Singleton, RD, LD

## 2022-12-30 NOTE — LETTER
"2022       RE: Rosaura Rosales  [address]  [facility] [address]     Dear Colleague,    Thank you for referring your patient, Rosaura Rosales, to the [facility] [place] at [facility]. Please see a copy of my visit note below.    New Medical Weight Management Consult    PATIENT:  Rosaura Rosales  MRN:         [id number]  :         1985  BERENICE:         2022    Dear Dr. Atwood    I had the pleasure of seeing your patient, Rosaura Rosales. Full intake/assessment was done to determine barriers to weight loss success and develop a treatment plan. Rosaura Rosales is a 37 year old female interested in treatment of medical problems associated with excess weight. She has a height of 5' 5\", a weight of 180 lbs 0 oz, and the calculated Body mass index is 29.95 kg/m .      Assessment & Plan   Problem List Items Addressed This Visit        Endocrine    Dyslipidemia    Relevant Medications    Semaglutide-Weight Management (WEGOVY) 0.25 MG/0.5ML SOAJ    Semaglutide-Weight Management (WEGOVY) 0.5 MG/0.5ML SOAJ       Other    Overweight with body mass index (BMI) of 29 to 29.9 in adult - Primary     Adult onset obesity after having daughter 11 years ago. Now, with elevated cholesterol and back pain wanting to consider more sustainable weight management options. Started phentermine with OB 2022 and has lost about 20lb since that time. Phentermine causing some jitteriness initially but more tolerable now.     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 Takes ambien as needed but this causes night time eating. Insomnia not worse with phentermine as long as she takes early enough in the day. Some days hungrier than others. Doesn't associate with large portions. Some stress eating/ emotional eating. We discussed switching to combination of phentermine/ topiramate (qsymia) vs starting wegovy. Given " "some side effects with phentermine, chose wegovy first. No hx pancreatitis. No personal or family hx MTC or MEN2. No reflux.      Has been intermittent fasting since September. Some anticipation of fasting and then eating more to compensate. Doesn't feel sustainable long term. Discussed working to find something more sustainable with the dietitian.     -start wegovy  -work on smaller portions to start  -work with dietitian  -Dot will call about information about health coaching and 24 week plan- more information below  -consider trying the podcast \"nothing much happens\"  -consider trying alternative to ambien  -follow up 2 months          Relevant Medications    Semaglutide-Weight Management (WEGOVY) 0.25 MG/0.5ML SOAJ    Semaglutide-Weight Management (WEGOVY) 0.5 MG/0.5ML SOAJ        45 minutes spent on the date of the encounter doing chart review, history and exam, documentation and further activities per the note      Rosaura Rosales is a 37 year old female who presents to clinic today for the following health issues       She has the following co-morbidities:       12/29/2022   I have the following health issues associated with obesity: High Cholesterol   I have the following symptoms associated with obesity: Back Pain, Fatigue       Patient Goals 12/29/2022   I am interested in having a healthier weight to diminish current health problems: Yes   I am interested in having a healthier weight in order to prevent future health problems: Yes   I am interested in having a healthier weight in order to have a future surgery: No       Referring Provider 12/29/2022   Please name the provider who referred you to Medical Weight Management.  If you do not know, please answer: \"I Don't Know\". I don't know       Weight History 12/29/2022   How concerned are you about your weight? Very Concerned   Would you describe your weight gain as gradual? Yes   I became overweight: After Pregnancy   The following factors have " contributed to my weight gain:  Change in Schedule, Started on Medication that Caused Weight Gain, Eating Wrong Types of Food, Genetic (Runs in the Family), Stress   I have tried the following methods to lose weight: Watching Portions or Calories, Exercise, Weight Watchers, Atkins-type Diet (Low Carb/High Protein), Medications, Meal Replacements   My lowest weight since age 19 was: 135   My highest weight since age 19 was: 220   The most weight I have ever lost was: (lbs) 40   I have the following family history of obesity/being overweight:  My mother is overweight, My father is overweight, One or more of my siblings are overweight, Many of my relatives are overweight   Has anyone in your family had weight loss surgery? No   How has your weight changed over the last year?  Gained   How many pounds? 20     130lb at wedding 13 years ago  150 at conception with daughter     Daughter is 10yo. Weight issues more significant   Back pain, new elevated cholesterol - wanting to see if other options for weight management     OB visit 9/19/2022- 199lb (BMI 33) - started phentermine     Doing intermittent fasting currently - stops eating at 8pm, eats first meal at 1-2pm - feels she eats a lot between her fasts- doesn't feel sustainable long term     Plateau x 4 weeks     Partner has vasectomy- IUD removed     No reflux       Diet Recall Review with Patient 12/29/2022   Do you typically eat breakfast? No   Do you typically eat lunch? Yes   If you do eat lunch, what types of food do you typically eat?  soup, sandwich, salad   Do you typically eat supper? Yes   If you do eat supper, what types of food do you typically eat? tacos, pasta, whatever kids are eating   Do you typically eat snacks? Yes   If you do snack, what types of food do you typically eat? chips   Do you like vegetables?  Yes   Do you drink water? Yes   How many glasses of juice do you drink in a typical day? 2   How many of glasses of milk do you drink in a typical  day? 2   If you do drink milk, what type? Skim   How many 8oz glasses of sugar containing drinks such as Moise-Aid/sweet tea do you drink in a day? 0   How many cans/bottles of sugar pop/soda/tea/sports drinks do you drink in a day? 1   How many cans/bottles of diet pop/soda/tea or sports drink do you drink in a day? 0   How often do you have a drink of alcohol? 2-4 Times a Month   If you do drink, how many drinks might you have in a day? 1 or 2       Eating Habits 12/29/2022   Generally, my meals include foods like these: bread, pasta, rice, potatoes, corn, crackers, sweet dessert, pop, or juice. A Few Times a Week   Generally, my meals include foods like these: fried meats, brats, burgers, french fries, pizza, cheese, chips, or ice cream. A Few Times a Week   Eat fast food (like [employer], [employer], [employer]). A Few Times a Week   Eat at a buffet or sit-down restaurant. Less Than Weekly   Eat most of my meals in front of the TV or computer. Once a Week   Often skip meals, eat at random times, have no regular eating times. Almost Everyday   Rarely sit down for a meal but snack or graze throughout.  A Few Times a Week   Eat extra snacks between meals. Once a Week   Eat most of my food at the end of the day. Everyday   Eat in the middle of the night or wake up at night to eat. A Few Times a Week   Eat extra snacks to prevent or correct low blood sugar. Never   Eat to prevent acid reflux or stomach pain. Never   Worry about not having enough food to eat. Never   Have you been to the food shelf at least a few times this year? No   I eat when I am depressed. Once a Week   I eat when I am stressed. Once a Week   I eat when I am bored. A Few Times a Week   I eat when I am anxious. Once a Week   I eat when I am happy or as a reward. Once a Week   I feel hungry all the time even if I just have eaten. Less Than Weekly   Feeling full is important to me. Less Than Weekly   I finish all the food on my plate even if I am  already full. Less Than Weekly   I can't resist eating delicious food or walk past the good food/smell. Once a Week   I eat/snack without noticing that I am eating. Once a Week   I eat when I am preparing the meal. Once a Week   I eat more than usual when I see others eating. Never   I have trouble not eating sweets, ice cream, cookies, or chips if they are around the house. Less Than Weekly   I think about food all day. Less Than Weekly   What foods, if any, do you crave? Chips/Crackers     Some hunger   Some stress eating/ emotional eating   Tries to eat when not hungry     Amount of Food 12/29/2022   I make myself vomit what I have eaten or use laxatives to get rid of food. Never   I eat a large amount of food, like a loaf of bread, a box of cookies, a pint/quart of ice cream, all at once. Never   I eat a large amount of food even when I am not hungry. Never   I eat rapidly. Monthly   I eat alone because I feel embarrassed and do not want others to see how much I have eaten. Never   I eat until I am uncomfortably full. Never   I feel bad, disgusted, or guilty after I overeat. Never   I make myself vomit what I have eaten or use laxatives to get rid of food. Never       Activity/Exercise History 12/29/2022   How much of a typical 12 hour day do you spend sitting? Half the Day   How much of a typical 12 hour day do you spend lying down? Less Than Half the Day   How much of a typical day do you spend walking/standing? Half the Day   How many hours (not including work) do you spend on the TV/Video Games/Computer/Tablet/Phone? 2-3 Hours   How many times a week are you active for the purpose of exercise? Once a Week   What keeps you from being more active? Lack of Time, Too tired   How many total minutes do you spend doing some activity for the purpose of exercising when you exercise? 15-30 Minutes       PAST MEDICAL HISTORY:  No past medical history on file.    Work/Social History Reviewed With Patient 12/29/2022   My  employment status is: Full-Time   My job is: RN   How much of your job is spent on the computer or phone? 75%   How many hours do you spend commuting to work daily?  2   What is your marital status? /In a Relationship   If in a relationship, is your significant other overweight? Yes   Do you have children? Yes   If you have children, are they overweight? No   Who do you live with?  child   Are they supportive of your health goals? Yes   Who does the food shopping?  i do       Mental Health History Reviewed With Patient 12/29/2022   Have you ever been physically or sexually abused? No   If yes, do you feel that the abuse is affecting your weight? N/A   If yes, would you like to talk to a counselor about the abuse? N/A   How often in the past 2 weeks have you felt little interest or pleasure in doing things? Not at all   Over the past 2 weeks how often have you felt down, depressed, or hopeless? Not at all       Sleep History Reviewed With Patient 12/29/2022   How many hours do you sleep at night? 6   Do you think that you snore loudly or has anybody ever heard you snore loudly (louder than talking or so loud it can be heard behind a shut door)? No   Has anyone seen or heard you stop breathing during your sleep? No   Do you often feel tired, fatigued, or sleepy during the day? No   Do you have a TV/Computer in your bedroom? Yes     Sometimes ambien for sleep- some night time eating with this   Difficult to fall asleep  Worked shift work for years   Tries to manage sleep hygiene     MEDICATIONS:   Current Outpatient Medications   Medication Sig Dispense Refill     gabapentin (NEURONTIN) 300 MG capsule Take 1 capsule (300 mg) by mouth nightly as needed for neuropathic pain 30 capsule 1     hydrOXYzine (ATARAX) 25 MG tablet Take 1 tablet (25 mg) by mouth 2 times daily as needed for itching 30 tablet 1     phentermine (ADIPEX-P) 37.5 MG tablet Take 1 tablet (37.5 mg) by mouth every morning (before breakfast) 90  tablet 0     Semaglutide-Weight Management (WEGOVY) 0.25 MG/0.5ML SOAJ Inject 0.25 mg Subcutaneous every 7 days 2 mL 0     Semaglutide-Weight Management (WEGOVY) 0.5 MG/0.5ML SOAJ Inject 0.5 mg Subcutaneous every 7 days 2 mL 1     zolpidem (AMBIEN) 5 MG tablet TAKE 1 TABLET BY MOUTH NIGHTLY AS NEEDED FOR SLEEP 30 tablet 0       ALLERGIES:   No Known Allergies    Office Visit on 09/19/2022   Component Date Value Ref Range Status     WBC Count 09/19/2022 7.1  4.0 - 11.0 10e3/uL Final     RBC Count 09/19/2022 4.88  3.80 - 5.20 10e6/uL Final     Hemoglobin 09/19/2022 14.5  11.7 - 15.7 g/dL Final     Hematocrit 09/19/2022 42.5  35.0 - 47.0 % Final     MCV 09/19/2022 87  78 - 100 fL Final     MCH 09/19/2022 29.7  26.5 - 33.0 pg Final     MCHC 09/19/2022 34.1  31.5 - 36.5 g/dL Final     RDW 09/19/2022 12.4  10.0 - 15.0 % Final     Platelet Count 09/19/2022 267  150 - 450 10e3/uL Final     Sodium 09/19/2022 136  133 - 144 mmol/L Final     Potassium 09/19/2022 4.0  3.4 - 5.3 mmol/L Final     Chloride 09/19/2022 105  94 - 109 mmol/L Final     Carbon Dioxide (CO2) 09/19/2022 24  20 - 32 mmol/L Final     Anion Gap 09/19/2022 7  3 - 14 mmol/L Final     Urea Nitrogen 09/19/2022 12  7 - 30 mg/dL Final     Creatinine 09/19/2022 0.73  0.52 - 1.04 mg/dL Final     Calcium 09/19/2022 8.9  8.5 - 10.1 mg/dL Final     Glucose 09/19/2022 88  70 - 99 mg/dL Final     Alkaline Phosphatase 09/19/2022 52  40 - 150 U/L Final     AST 09/19/2022 14  0 - 45 U/L Final     ALT 09/19/2022 37  0 - 50 U/L Final     Protein Total 09/19/2022 7.6  6.8 - 8.8 g/dL Final     Albumin 09/19/2022 4.1  3.4 - 5.0 g/dL Final     Bilirubin Total 09/19/2022 0.5  0.2 - 1.3 mg/dL Final     GFR Estimate 09/19/2022 >90  >60 mL/min/1.73m2 Final    Effective December 21, 2021 eGFRcr in adults is calculated using the 2021 CKD-EPI creatinine equation which includes age and gender (Zeeshan et al., NEJM, DOI: 10.1056/MSTUee6536582)     TSH 09/19/2022 1.88  0.40 - 4.00 mU/L Final      Cholesterol 09/19/2022 237 (H)  <200 mg/dL Final     Triglycerides 09/19/2022 235 (H)  <150 mg/dL Final     Direct Measure HDL 09/19/2022 48 (L)  >=50 mg/dL Final     LDL Cholesterol Calculated 09/19/2022 142 (H)  <=100 mg/dL Final     Non HDL Cholesterol 09/19/2022 189 (H)  <130 mg/dL Final     Patient Fasting > 8hrs? 09/19/2022 Yes   Final     Neisseria gonorrhoeae 09/19/2022 Negative  Negative Final    Negative for N. gonorrhoeae rRNA by transcription mediated amplification. A negative result by transcription mediated amplification does not preclude the presence of C. trachomatis infection because results are dependent on proper and adequate collection, absence of inhibitors and sufficient rRNA to be detected.     Chlamydia trachomatis 09/19/2022 Negative  Negative Final    A negative result by transcription mediated amplification does not preclude the presence of C. trachomatis infection because results are dependent on proper and adequate collection, absence of inhibitors and sufficient rRNA to be detected.     Interpretation 09/19/2022 Negative for Intraepithelial Lesion or Malignancy (NILM)    Final     Other Findings 09/19/2022 Bacteria morphologically consistent with Actinomyces spp  Endometrial cells in a woman >=45 years of age; endometrial cells correlate with menstrual history provided, Trichomonas vaginalis, Fungal organisms morphologically consistent with Candida spp, Shift in vaginal faustina suggestive of bacterial vaginosis,... Final     Comment 09/19/2022    Final                    Value:This result contains rich text formatting which cannot be displayed here.     Specimen Adequacy 09/19/2022 Satisfactory for evaluation, endocervical/transformation zone component present   Final     Clinical Information 09/19/2022    Final                    Value:This result contains rich text formatting which cannot be displayed here.     Reflex Testing 09/19/2022 Yes regardless of result   Final     Previous  "Abnormal? 09/19/2022    Final                    Value:This result contains rich text formatting which cannot be displayed here.     Performing Labs 09/19/2022    Final                    Value:This result contains rich text formatting which cannot be displayed here.     Trichomonas 09/19/2022 Absent  Absent Final     Yeast 09/19/2022 Absent  Absent Final     Clue Cells 09/19/2022 Absent  Absent Final     WBCs/high power field 09/19/2022 None  None Final     Other HR HPV 09/19/2022 Negative  Negative Final     HPV16 DNA 09/19/2022 Negative  Negative Final     HPV18 DNA 09/19/2022 Negative  Negative Final     FINAL DIAGNOSIS 09/19/2022    Final                    Value:This result contains rich text formatting which cannot be displayed here.       PHYSICAL EXAM:  Objective     Ht 1.651 m (5' 5\")   Wt 81.6 kg (180 lb)   BMI 29.95 kg/m    Vitals - Patient Reported  Pain Score: No Pain (0)      Vitals:  No vitals were obtained today due to virtual visit.    Physical Exam   GENERAL: Healthy, alert and no distress  EYES: Eyes grossly normal to inspection.  No discharge or erythema, or obvious scleral/conjunctival abnormalities.  RESP: No audible wheeze, cough, or visible cyanosis.  No visible retractions or increased work of breathing.    SKIN: Visible skin clear. No significant rash, abnormal pigmentation or lesions.  NEURO: Cranial nerves grossly intact.  Mentation and speech appropriate for age.  PSYCH: Mentation appears normal, affect normal/bright, judgement and insight intact, normal speech and appearance well-groomed.        Sincerely,    ABEL Carragustín Rosales is a 37 year old who is being evaluated via a billable video visit.      How would you like to obtain your AVS? MyChart  If the video visit is dropped, the invitation should be resent by: Text to cell phone: [phone]  Will anyone else be joining your video visit? No    During this virtual visit the patient is located in [place], patient " verifies this as the location during the entirety of this visit.      Video-Visit Details    Video Start Time: 0900    Type of service:  Video Visit    Video End Time:0929    Originating Location (pt. Location): Home        Distant Location (provider location):  Off-site    Platform used for Video Visit: Cristine

## 2022-12-30 NOTE — PROGRESS NOTES
"Rosaura Rosales is a 37 year old female who is being evaluated via a billable video visit.      The patient has been notified of following:     \"This video visit will be conducted via a call between you and your physician/provider. We have found that certain health care needs can be provided without the need for an in-person physical exam.  This service lets us provide the care you need with a video conversation.  If a prescription is necessary we can send it directly to your pharmacy.  If lab work is needed we can place an order for that and you can then stop by our lab to have the test done at a later time.    Video visits are billed at different rates depending on your insurance coverage.  Please reach out to your insurance provider with any questions.    If during the course of the call the physician/provider feels a video visit is not appropriate, you will not be charged for this service.\"    Patient has given verbal consent for Video visit? Yes  How would you like to obtain your AVS? MyChart  If you are dropped from the video visit, the video invite should be resent to: Text to cell phone: [phone]  Will anyone else be joining your video visit? No  {If patient encounters technical issues they should call [phone]      Video-Visit Details    Type of service:  Video Visit    Video Start Time: 1:30 pm   Video End Time: 1:40 pm    Originating Location (pt. Location): Home    Distant Location (provider location):  Offsite (providers home)     Platform used for Video Visit: Media Redefined    During this virtual visit the patient is located in [place], patient verifies this as the location during the entirety of this visit.     New Weight Management Nutrition Consultation    Rosaura Rosales is a 37 year old female presents today for new weight management nutrition consultation.  Patient referred by Marialuisa Montoya on December 30, 2022.    Patient with Co-morbidities of obesity including:  Type II DM no  Renal Failure " "no  Sleep apnea no  Hypertension no   Dyslipidemia yes, high chol  Joint pain no  Back pain yes  GERD no     PMH: fatigue    Anthropometrics:  Weight 12/30/22: 180 lbs with BMI 29.95    Weight hx per NP note:  Was 130 lbs at wedding 13 years ago   150 lbs at conception with daughter    Estimated body mass index is 29.95 kg/m  as calculated from the following:    Height as of an earlier encounter on 12/30/22: 1.651 m (5' 5\").    Weight as of an earlier encounter on 12/30/22: 81.6 kg (180 lb).       Medications for Weight Loss:  Wegovy prescribed 12/30/22    Has been on Phentermine since Sept 2022 - has lost 20 lbs since then.    Supplements:   Fish oil  Vit D   MVI     NUTRITION HISTORY  NKFA    Weight gain started after having her daughter.     Pt goals: manage weight, decrease chol, improve back pan     Feels knowable in nutrition but wants to see if anything else is helpful. Thought the appt today was required - let her know it is not. Patient said she is open to discussing nutrition but has no major questions/concerns.     Started Phentermine with OB in September. Also has been doing IF 16:8, does not feel sustainable long-term but wanted initial weight loss. Currently eats between 1-8 pm typically. 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 Lots of back pain. Wants to workout more     Considering 24 week but not sure, talked to Dot this morning     Additional information:   RN - shift work    Daughter - 11 as of Dec 2022    Nutrition Prescription  Recommended energy/nutrient modification.    Nutrition Diagnosis  Obesity r/t long history of positive energy balance aeb BMI >30.    Nutrition Intervention  Reviewed current dietary habits and pts history   Discussed long-term goals pt hopes to " accomplish in RD appointments  Answered pt questions  Coordination of care   Nutrition education - Activity, macro needs  AVS and handouts via [employer]    Patient demonstrates understanding.    Expected Engagement: good    Resources/Considerations:  Could consider 14:10 IF if more sustainable right now    Eating hrs for example:  10 am - 8 pm    Could also consider replacing breakfast with a lower kcal protein shake then doing 2 regular meals.    Estimated recommended needs for weight loss:    ~ 1200 kcal/day (at sedentary lifestyle)    Carbohydrates: ~120 g/day  Fat: ~40 g/day  Protein: ~90 g/day    Other resources:  Plate method can be used for general guidance on balanced meals/portion sizes (see link below for picture/more information)                 Make 1/2 your plate non starchy vegetables (cauliflower, broccoli, asparagus, [place] sprouts, lettuce, carrots, for example).                3+ oz lean protein sources (salmon/skinless chicken/turkey breast/pork loin/lean cuts of beef/ or non-animal protein such as black, kidney or villalobos beans, tofu/edamame/tempeh)     (Note: 3 oz = deck of cards size)                 1/2 to 1 cup carbohydrate choices such as whole grain starches or starchy vegetables or fruit. For example: quinoa, brown rice, barley, potatoes, sweet potatoes, winter squash, peas, corn, or fruit.               Choose ~0-2 added fat servings at a meal (avocados, nut butters, nuts or seeds, olive oil, vegetable oils).    The Plate Method:  https://www.cdc.gov/diabetes/images/managing/Diabetes-Manage-Eat-Well-Plate-Graphic_600px.jpg  - Initially designed for diabetes management but works well for weigh loss as well     Protein Sources   [email or web address]     Carbohydrates  [email or web address]     Mindful Eating  [email or web address]     Summary of Volumetrics Eating Plan  [email or web address]     Follow-Up:  PRN    Time spent with patient: 10 minutes.  Audelia CROOKS  JEVON Kirby, SEAMUS, HERMAN

## 2022-12-30 NOTE — TELEPHONE ENCOUNTER
Called pt to discuss 24 wk plan. Pt is a  employee; nurse.  Ref by Marialuisa Montoya  Explained the role of HC; pt is not sure she is interested so will think about it and I will send her the info on VUELOGIC.

## 2022-12-30 NOTE — PROGRESS NOTES
Rosaura Rosales is a 37 year old who is being evaluated via a billable video visit.      How would you like to obtain your AVS? MyChart  If the video visit is dropped, the invitation should be resent by: Text to cell phone: [phone]  Will anyone else be joining your video visit? No    During this virtual visit the patient is located in [place], patient verifies this as the location during the entirety of this visit.      Video-Visit Details    Video Start Time: 0900    Type of service:  Video Visit    Video End Time:0929    Originating Location (pt. Location): Home        Distant Location (provider location):  Off-site    Platform used for Video Visit: [employer]

## 2022-12-30 NOTE — Clinical Note
PA for weovy please 
Would like to know more about health coaching and 24 week program. She is a [employer] employee. Thanks! 
Neonatologist

## 2022-12-30 NOTE — PROGRESS NOTES
"New Medical Weight Management Consult    PATIENT:  Rosaura Rosales  MRN:         [id number]  :         1985  BERENICE:         2022    Dear Dr. Atwood    I had the pleasure of seeing your patient, Rosaura Rosales. Full intake/assessment was done to determine barriers to weight loss success and develop a treatment plan. Rosaura Rosales is a 37 year old female interested in treatment of medical problems associated with excess weight. She has a height of 5' 5\", a weight of 180 lbs 0 oz, and the calculated Body mass index is 29.95 kg/m .      Assessment & Plan   Problem List Items Addressed This Visit        Endocrine    Dyslipidemia    Relevant Medications    Semaglutide-Weight Management (WEGOVY) 0.25 MG/0.5ML SOAJ    Semaglutide-Weight Management (WEGOVY) 0.5 MG/0.5ML SOAJ       Other    Overweight with body mass index (BMI) of 29 to 29.9 in adult - Primary     Adult onset obesity after having daughter 11 years ago. Now, with elevated cholesterol and back pain wanting to consider more sustainable weight management options. Started phentermine with OB 2022 and has lost about 20lb since that time. Phentermine causing some jitteriness initially but more tolerable now.     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 Takes ambien as needed but this causes night time eating. Insomnia not worse with phentermine as long as she takes early enough in the day. Some days hungrier than others. Doesn't associate with large portions. Some stress eating/ emotional eating. We discussed switching to combination of phentermine/ topiramate (qsymia) vs starting wegovy. Given some side effects with phentermine, chose wegovy first. No hx pancreatitis. No personal or family hx MTC or MEN2. No reflux.      Has been intermittent fasting since September. Some anticipation of fasting and then eating more to compensate. Doesn't feel sustainable long term. Discussed working to find something more " "sustainable with the dietitian.     -start wegovy  -work on smaller portions to start  -work with dietitian  -Dot will call about information about health coaching and 24 week plan- more information below  -consider trying the podcast \"nothing much happens\"  -consider trying alternative to ambien  -follow up 2 months          Relevant Medications    Semaglutide-Weight Management (WEGOVY) 0.25 MG/0.5ML SOAJ    Semaglutide-Weight Management (WEGOVY) 0.5 MG/0.5ML SOAJ        45 minutes spent on the date of the encounter doing chart review, history and exam, documentation and further activities per the note      Rosaura Rosales is a 37 year old female who presents to clinic today for the following health issues       She has the following co-morbidities:       12/29/2022   I have the following health issues associated with obesity: High Cholesterol   I have the following symptoms associated with obesity: Back Pain, Fatigue       Patient Goals 12/29/2022   I am interested in having a healthier weight to diminish current health problems: Yes   I am interested in having a healthier weight in order to prevent future health problems: Yes   I am interested in having a healthier weight in order to have a future surgery: No       Referring Provider 12/29/2022   Please name the provider who referred you to Medical Weight Management.  If you do not know, please answer: \"I Don't Know\". I don't know       Weight History 12/29/2022   How concerned are you about your weight? Very Concerned   Would you describe your weight gain as gradual? Yes   I became overweight: After Pregnancy   The following factors have contributed to my weight gain:  Change in Schedule, Started on Medication that Caused Weight Gain, Eating Wrong Types of Food, Genetic (Runs in the Family), Stress   I have tried the following methods to lose weight: Watching Portions or Calories, Exercise, Weight Watchers, Atkins-type Diet (Low Carb/High Protein), " Medications, Meal Replacements   My lowest weight since age 19 was: 135   My highest weight since age 19 was: 220   The most weight I have ever lost was: (lbs) 40   I have the following family history of obesity/being overweight:  My mother is overweight, My father is overweight, One or more of my siblings are overweight, Many of my relatives are overweight   Has anyone in your family had weight loss surgery? No   How has your weight changed over the last year?  Gained   How many pounds? 20     130lb at wedding 13 years ago  150 at conception with daughter     Daughter is 12yo. Weight issues more significant   Back pain, new elevated cholesterol - wanting to see if other options for weight management     OB visit 9/19/2022- 199lb (BMI 33) - started phentermine     Doing intermittent fasting currently - stops eating at 8pm, eats first meal at 1-2pm - feels she eats a lot between her fasts- doesn't feel sustainable long term     Plateau x 4 weeks     Partner has vasectomy- IUD removed     No reflux       Diet Recall Review with Patient 12/29/2022   Do you typically eat breakfast? No   Do you typically eat lunch? Yes   If you do eat lunch, what types of food do you typically eat?  soup, sandwich, salad   Do you typically eat supper? Yes   If you do eat supper, what types of food do you typically eat? tacos, pasta, whatever kids are eating   Do you typically eat snacks? Yes   If you do snack, what types of food do you typically eat? chips   Do you like vegetables?  Yes   Do you drink water? Yes   How many glasses of juice do you drink in a typical day? 2   How many of glasses of milk do you drink in a typical day? 2   If you do drink milk, what type? Skim   How many 8oz glasses of sugar containing drinks such as Moise-Aid/sweet tea do you drink in a day? 0   How many cans/bottles of sugar pop/soda/tea/sports drinks do you drink in a day? 1   How many cans/bottles of diet pop/soda/tea or sports drink do you drink in a  day? 0   How often do you have a drink of alcohol? 2-4 Times a Month   If you do drink, how many drinks might you have in a day? 1 or 2       Eating Habits 12/29/2022   Generally, my meals include foods like these: bread, pasta, rice, potatoes, corn, crackers, sweet dessert, pop, or juice. A Few Times a Week   Generally, my meals include foods like these: fried meats, brats, burgers, french fries, pizza, cheese, chips, or ice cream. A Few Times a Week   Eat fast food (like [employer]onal[employer], [employer], Taco Bell). A Few Times a Week   Eat at a buffet or sit-down restaurant. Less Than Weekly   Eat most of my meals in front of the TV or computer. Once a Week   Often skip meals, eat at random times, have no regular eating times. Almost Everyday   Rarely sit down for a meal but snack or graze throughout.  A Few Times a Week   Eat extra snacks between meals. Once a Week   Eat most of my food at the end of the day. Everyday   Eat in the middle of the night or wake up at night to eat. A Few Times a Week   Eat extra snacks to prevent or correct low blood sugar. Never   Eat to prevent acid reflux or stomach pain. Never   Worry about not having enough food to eat. Never   Have you been to the food shelf at least a few times this year? No   I eat when I am depressed. Once a Week   I eat when I am stressed. Once a Week   I eat when I am bored. A Few Times a Week   I eat when I am anxious. Once a Week   I eat when I am happy or as a reward. Once a Week   I feel hungry all the time even if I just have eaten. Less Than Weekly   Feeling full is important to me. Less Than Weekly   I finish all the food on my plate even if I am already full. Less Than Weekly   I can't resist eating delicious food or walk past the good food/smell. Once a Week   I eat/snack without noticing that I am eating. Once a Week   I eat when I am preparing the meal. Once a Week   I eat more than usual when I see others eating. Never   I have trouble not eating  sweets, ice cream, cookies, or chips if they are around the house. Less Than Weekly   I think about food all day. Less Than Weekly   What foods, if any, do you crave? Chips/Crackers     Some hunger   Some stress eating/ emotional eating   Tries to eat when not hungry     Amount of Food 12/29/2022   I make myself vomit what I have eaten or use laxatives to get rid of food. Never   I eat a large amount of food, like a loaf of bread, a box of cookies, a pint/quart of ice cream, all at once. Never   I eat a large amount of food even when I am not hungry. Never   I eat rapidly. Monthly   I eat alone because I feel embarrassed and do not want others to see how much I have eaten. Never   I eat until I am uncomfortably full. Never   I feel bad, disgusted, or guilty after I overeat. Never   I make myself vomit what I have eaten or use laxatives to get rid of food. Never       Activity/Exercise History 12/29/2022   How much of a typical 12 hour day do you spend sitting? Half the Day   How much of a typical 12 hour day do you spend lying down? Less Than Half the Day   How much of a typical day do you spend walking/standing? Half the Day   How many hours (not including work) do you spend on the TV/Video Games/Computer/Tablet/Phone? 2-3 Hours   How many times a week are you active for the purpose of exercise? Once a Week   What keeps you from being more active? Lack of Time, Too tired   How many total minutes do you spend doing some activity for the purpose of exercising when you exercise? 15-30 Minutes       PAST MEDICAL HISTORY:  No past medical history on file.    Work/Social History Reviewed With Patient 12/29/2022   My employment status is: Full-Time   My job is: RN   How much of your job is spent on the computer or phone? 75%   How many hours do you spend commuting to work daily?  2   What is your marital status? /In a Relationship   If in a relationship, is your significant other overweight? Yes   Do you have  children? Yes   If you have children, are they overweight? No   Who do you live with?  child   Are they supportive of your health goals? Yes   Who does the food shopping?  i do       Mental Health History Reviewed With Patient 12/29/2022   Have you ever been physically or sexually abused? No   If yes, do you feel that the abuse is affecting your weight? N/A   If yes, would you like to talk to a counselor about the abuse? N/A   How often in the past 2 weeks have you felt little interest or pleasure in doing things? Not at all   Over the past 2 weeks how often have you felt down, depressed, or hopeless? Not at all       Sleep History Reviewed With Patient 12/29/2022   How many hours do you sleep at night? 6   Do you think that you snore loudly or has anybody ever heard you snore loudly (louder than talking or so loud it can be heard behind a shut door)? No   Has anyone seen or heard you stop breathing during your sleep? No   Do you often feel tired, fatigued, or sleepy during the day? No   Do you have a TV/Computer in your bedroom? Yes     Sometimes ambien for sleep- some night time eating with this   Difficult to fall asleep  Worked shift work for years   Tries to manage sleep hygiene     MEDICATIONS:   Current Outpatient Medications   Medication Sig Dispense Refill     gabapentin (NEURONTIN) 300 MG capsule Take 1 capsule (300 mg) by mouth nightly as needed for neuropathic pain 30 capsule 1     hydrOXYzine (ATARAX) 25 MG tablet Take 1 tablet (25 mg) by mouth 2 times daily as needed for itching 30 tablet 1     phentermine (ADIPEX-P) 37.5 MG tablet Take 1 tablet (37.5 mg) by mouth every morning (before breakfast) 90 tablet 0     Semaglutide-Weight Management (WEGOVY) 0.25 MG/0.5ML SOAJ Inject 0.25 mg Subcutaneous every 7 days 2 mL 0     Semaglutide-Weight Management (WEGOVY) 0.5 MG/0.5ML SOAJ Inject 0.5 mg Subcutaneous every 7 days 2 mL 1     zolpidem (AMBIEN) 5 MG tablet TAKE 1 TABLET BY MOUTH NIGHTLY AS NEEDED FOR  SLEEP 30 tablet 0       ALLERGIES:   No Known Allergies    Office Visit on 09/19/2022   Component Date Value Ref Range Status     WBC Count 09/19/2022 7.1  4.0 - 11.0 10e3/uL Final     RBC Count 09/19/2022 4.88  3.80 - 5.20 10e6/uL Final     Hemoglobin 09/19/2022 14.5  11.7 - 15.7 g/dL Final     Hematocrit 09/19/2022 42.5  35.0 - 47.0 % Final     MCV 09/19/2022 87  78 - 100 fL Final     MCH 09/19/2022 29.7  26.5 - 33.0 pg Final     MCHC 09/19/2022 34.1  31.5 - 36.5 g/dL Final     RDW 09/19/2022 12.4  10.0 - 15.0 % Final     Platelet Count 09/19/2022 267  150 - 450 10e3/uL Final     Sodium 09/19/2022 136  133 - 144 mmol/L Final     Potassium 09/19/2022 4.0  3.4 - 5.3 mmol/L Final     Chloride 09/19/2022 105  94 - 109 mmol/L Final     Carbon Dioxide (CO2) 09/19/2022 24  20 - 32 mmol/L Final     Anion Gap 09/19/2022 7  3 - 14 mmol/L Final     Urea Nitrogen 09/19/2022 12  7 - 30 mg/dL Final     Creatinine 09/19/2022 0.73  0.52 - 1.04 mg/dL Final     Calcium 09/19/2022 8.9  8.5 - 10.1 mg/dL Final     Glucose 09/19/2022 88  70 - 99 mg/dL Final     Alkaline Phosphatase 09/19/2022 52  40 - 150 U/L Final     AST 09/19/2022 14  0 - 45 U/L Final     ALT 09/19/2022 37  0 - 50 U/L Final     Protein Total 09/19/2022 7.6  6.8 - 8.8 g/dL Final     Albumin 09/19/2022 4.1  3.4 - 5.0 g/dL Final     Bilirubin Total 09/19/2022 0.5  0.2 - 1.3 mg/dL Final     GFR Estimate 09/19/2022 >90  >60 mL/min/1.73m2 Final    Effective December 21, 2021 eGFRcr in adults is calculated using the 2021 CKD-EPI creatinine equation which includes age and gender (Zeeshan et al., NEJ, DOI: 10.1056/LHKIsl0220020)     TSH 09/19/2022 1.88  0.40 - 4.00 mU/L Final     Cholesterol 09/19/2022 237 (H)  <200 mg/dL Final     Triglycerides 09/19/2022 235 (H)  <150 mg/dL Final     Direct Measure HDL 09/19/2022 48 (L)  >=50 mg/dL Final     LDL Cholesterol Calculated 09/19/2022 142 (H)  <=100 mg/dL Final     Non HDL Cholesterol 09/19/2022 189 (H)  <130 mg/dL Final      Patient Fasting > 8hrs? 09/19/2022 Yes   Final     Neisseria gonorrhoeae 09/19/2022 Negative  Negative Final    Negative for N. gonorrhoeae rRNA by transcription mediated amplification. A negative result by transcription mediated amplification does not preclude the presence of C. trachomatis infection because results are dependent on proper and adequate collection, absence of inhibitors and sufficient rRNA to be detected.     Chlamydia trachomatis 09/19/2022 Negative  Negative Final    A negative result by transcription mediated amplification does not preclude the presence of C. trachomatis infection because results are dependent on proper and adequate collection, absence of inhibitors and sufficient rRNA to be detected.     Interpretation 09/19/2022 Negative for Intraepithelial Lesion or Malignancy (NILM)    Final     Other Findings 09/19/2022 Bacteria morphologically consistent with Actinomyces spp  Endometrial cells in a woman >=45 years of age; endometrial cells correlate with menstrual history provided, Trichomonas vaginalis, Fungal organisms morphologically consistent with Candida spp, Shift in vaginal faustina suggestive of bacterial vaginosis,... Final     Comment 09/19/2022    Final                    Value:This result contains rich text formatting which cannot be displayed here.     Specimen Adequacy 09/19/2022 Satisfactory for evaluation, endocervical/transformation zone component present   Final     Clinical Information 09/19/2022    Final                    Value:This result contains rich text formatting which cannot be displayed here.     Reflex Testing 09/19/2022 Yes regardless of result   Final     Previous Abnormal? 09/19/2022    Final                    Value:This result contains rich text formatting which cannot be displayed here.     Performing Labs 09/19/2022    Final                    Value:This result contains rich text formatting which cannot be displayed here.     Trichomonas 09/19/2022 Absent  " Absent Final     Yeast 09/19/2022 Absent  Absent Final     Clue Cells 09/19/2022 Absent  Absent Final     WBCs/high power field 09/19/2022 None  None Final     Other HR HPV 09/19/2022 Negative  Negative Final     HPV16 DNA 09/19/2022 Negative  Negative Final     HPV18 DNA 09/19/2022 Negative  Negative Final     FINAL DIAGNOSIS 09/19/2022    Final                    Value:This result contains rich text formatting which cannot be displayed here.       PHYSICAL EXAM:  Objective    Ht 1.651 m (5' 5\")   Wt 81.6 kg (180 lb)   BMI 29.95 kg/m    Vitals - Patient Reported  Pain Score: No Pain (0)      Vitals:  No vitals were obtained today due to virtual visit.    Physical Exam   GENERAL: Healthy, alert and no distress  EYES: Eyes grossly normal to inspection.  No discharge or erythema, or obvious scleral/conjunctival abnormalities.  RESP: No audible wheeze, cough, or visible cyanosis.  No visible retractions or increased work of breathing.    SKIN: Visible skin clear. No significant rash, abnormal pigmentation or lesions.  NEURO: Cranial nerves grossly intact.  Mentation and speech appropriate for age.  PSYCH: Mentation appears normal, affect normal/bright, judgement and insight intact, normal speech and appearance well-groomed.        Sincerely,    Marialuisa Montoya NP      "

## 2023-02-08 ENCOUNTER — E-VISIT (OUTPATIENT)
Dept: URGENT CARE | Facility: CLINIC | Age: 38
End: 2023-02-08
Payer: COMMERCIAL

## 2023-02-08 DIAGNOSIS — H10.31 ACUTE BACTERIAL CONJUNCTIVITIS OF RIGHT EYE: Primary | ICD-10-CM

## 2023-02-08 PROCEDURE — 99421 OL DIG E/M SVC 5-10 MIN: CPT | Performed by: EMERGENCY MEDICINE

## 2023-02-08 RX ORDER — POLYMYXIN B SULFATE AND TRIMETHOPRIM 1; 10000 MG/ML; [USP'U]/ML
SOLUTION OPHTHALMIC
Qty: 10 ML | Refills: 0 | Status: SHIPPED | OUTPATIENT
Start: 2023-02-08 | End: 2023-02-15

## 2023-02-10 ENCOUNTER — LAB (OUTPATIENT)
Dept: LAB | Facility: CLINIC | Age: 38
End: 2023-02-10
Payer: COMMERCIAL

## 2023-02-10 DIAGNOSIS — Z11.3 SCREENING FOR VENEREAL DISEASE: ICD-10-CM

## 2023-02-10 LAB
CLUE CELLS: ABNORMAL
TRICHOMONAS, WET PREP: ABNORMAL
WBC'S/HIGH POWER FIELD, WET PREP: ABNORMAL
YEAST, WET PREP: ABNORMAL

## 2023-02-10 PROCEDURE — 86803 HEPATITIS C AB TEST: CPT

## 2023-02-10 PROCEDURE — 87591 N.GONORRHOEAE DNA AMP PROB: CPT

## 2023-02-10 PROCEDURE — 36415 COLL VENOUS BLD VENIPUNCTURE: CPT

## 2023-02-10 PROCEDURE — 87210 SMEAR WET MOUNT SALINE/INK: CPT

## 2023-02-10 PROCEDURE — 87389 HIV-1 AG W/HIV-1&-2 AB AG IA: CPT

## 2023-02-10 PROCEDURE — 87491 CHLMYD TRACH DNA AMP PROBE: CPT

## 2023-02-10 PROCEDURE — 87340 HEPATITIS B SURFACE AG IA: CPT

## 2023-02-10 PROCEDURE — 86780 TREPONEMA PALLIDUM: CPT

## 2023-02-11 LAB
C TRACH DNA SPEC QL NAA+PROBE: NEGATIVE
HBV SURFACE AG SERPL QL IA: NONREACTIVE
HCV AB SERPL QL IA: NONREACTIVE
HIV 1+2 AB+HIV1 P24 AG SERPL QL IA: NONREACTIVE
N GONORRHOEA DNA SPEC QL NAA+PROBE: NEGATIVE
T PALLIDUM AB SER QL: NONREACTIVE

## 2023-02-22 NOTE — PROGRESS NOTES
Virtual Visit Check-In    During this virtual visit the patient is located in [place], patient verifies this as the location during the entirety of this visit.     Rosaura is a 37 year old who is being evaluated via a billable video visit.      How would you like to obtain your AVS? MyChart  If the video visit is dropped, the invitation should be resent by: Text to cell phone: [phone]  Will anyone else be joining your video visit? No        Video-Visit Details    Type of service:  Video Visit     Originating Location (pt. Location): Home    Distant Location (provider location):  Off-site  Platform used for Video Visit: [employer]         Video Start Time: 1030  Video End Time:1035    Cat Castrejon, EMT

## 2023-02-23 ENCOUNTER — TELEPHONE (OUTPATIENT)
Dept: ENDOCRINOLOGY | Facility: CLINIC | Age: 38
End: 2023-02-23

## 2023-02-23 ENCOUNTER — VIRTUAL VISIT (OUTPATIENT)
Dept: ENDOCRINOLOGY | Facility: CLINIC | Age: 38
End: 2023-02-23
Payer: COMMERCIAL

## 2023-02-23 VITALS — WEIGHT: 173 LBS | BODY MASS INDEX: 28.79 KG/M2

## 2023-02-23 DIAGNOSIS — E66.3 OVERWEIGHT WITH BODY MASS INDEX (BMI) OF 29 TO 29.9 IN ADULT: Primary | ICD-10-CM

## 2023-02-23 PROCEDURE — 99212 OFFICE O/P EST SF 10 MIN: CPT | Mod: VID | Performed by: NURSE PRACTITIONER

## 2023-02-23 RX ORDER — SEMAGLUTIDE 1.7 MG/.75ML
1.7 INJECTION, SOLUTION SUBCUTANEOUS
Qty: 3 ML | Refills: 2 | Status: SHIPPED | OUTPATIENT
Start: 2023-02-23 | End: 2023-07-25

## 2023-02-23 RX ORDER — SEMAGLUTIDE 1 MG/.5ML
1 INJECTION, SOLUTION SUBCUTANEOUS
Qty: 2 ML | Refills: 1 | Status: SHIPPED | OUTPATIENT
Start: 2023-02-23 | End: 2023-07-25 | Stop reason: DRUGHIGH

## 2023-02-23 NOTE — NURSING NOTE
Chief Complaint   Patient presents with     Follow Up     Return weight management.         Vitals:    02/23/23 1001   Weight: 173 lb       Body mass index is 28.79 kg/m .      Cat Soto, EMT  [facility]

## 2023-02-23 NOTE — TELEPHONE ENCOUNTER
PA needed for Wegovy. Please submit PA and let Liaison know when Approved / Denied    CLEARSCRIPT  BIN: [id number]  ID: [id number]  GROUP:   PCN: [id number]

## 2023-02-23 NOTE — PATIENT INSTRUCTIONS
"Thank you for allowing us the privilege of caring for you. We hope we provided you with the excellent service you deserve.   Please let us know if there is anything else we can do for you so that we can be sure you are completely satisfied with your care experience.    To ensure the quality of our services you may be receiving a patient satisfaction survey from an independent patient satisfaction monitoring company.    The greatest compliment you can give is a \"Likely to Recommend\"    Your visit was with Marialuisa Montoya NP today.    Instructions per today's visit:     Harjinder Rosales, it was great to visit with you today.  Here is a review of our visit.  If our clinic scheduler is not able to reach you please call [phone] to schedule your next appointments.      Plan:  Pay attention to hydration  Increase to 1mg wegovy x 1-2 months   Then go up to 1.7mg wegovy   Follow up 3-4 months       Information about Video Visits with [facility]: video visit information  _________________________________________________________________________________________________________________________________________________________  If you are asked by your clinic team to have your blood pressure checked:  [facility] do offer several locations for blood pressure checks. Please follow the below link to schedule an appointment. Scheduling an appointment at the pharmacy for a blood pressure check is now preferred.    Appointment Plus (appointment-plus.[facility])  _________________________________________________________________________________________________________________________________________________________  Important contact and scheduling information:  Please call our contact center at [phone] to schedule your next appointments.  To find a lab location near you, please call [phone].  For any nursing questions or concerns call Candie Youssef LPN at [phone] or Mary Canchola RN at [phone]  Please call " during clinic hours Monday through Friday 8:00a - 4:00p if you have questions or you can contact us via Trend[employer]at at anytime and we will reply during clinic hours.    Lab results will be communicated through My Chart or letter (if My Chart not used). Please call the clinic if you have not received communication after 1 week or if you have any questions.?  Clinic Fax: [phone]    _________________________________________________________________________________________________________________________________________________________  Meal Replacement Products:    Here is the link to our new e-store where you can purchase our meal replacement products    [employer] E-Store  [employer]/store    The one week starter kit is a great way to sample a variety of products and see what works for you.    If you want more information about the product go to: [email or web address]    If you are an employee or [facility] or [facility] please contact your care team for a 10% estore discount    Free Shipping for orders over $75     Benefits of meal replacements products:    Portion and calorie control  Improved nutrition  Structured eating  Simplified food choices  Avoid contact with trigger foods  _________________________________________________________________________________________________________________________________________________________  Interested in working with a health ?  Health coaches work with you to improve your overall health and wellbeing.  They look at the whole person, and may involve discussion of different areas of life, including, but not limited to the four pillars of health (sleep, exercise, nutrition, and stress management). Discuss with your care team if you would like to start working a health .  Health Coaching-3 Pack: Schedule by calling [phone]    $99 for three health coaching visits    Visits may be done in person or via  phone    Coaching is a partnership between the  and the client; Coaches do not prescribe or diagnose    Coaching helps inspire the client to reach his/her personal goals   _________________________________________________________________________________________________________________________________________________________  24 Week Healthy Lifestyle Plan:    Our mission in the 24-week Healthy Lifestyle Plan is to provide you with individualized care by giving you the tools, education and support you need to lose weight and maintain a healthy lifestyle. In your 24-week journey, you ll be supported by a dedicated weight loss team that includes registered dietitians, medical weight management providers, health coaches, and nurses -- all with special expertise in weight loss -- to help you every step of the way.     Monthly meetings with your registered dietician or medical weight management provider help to review your progress, update your care plan, and make any adjustments needed to ensure success. Between these visits, weekly and bi-weekly health  visits will help you focus on the four pillars of weight loss -- stress, sleep, nutrition, and exercise -- and how you can best adapt each to achieve sustainable weight loss results.    In addition, you will be given exclusive access to online wellbeing classes through [employer].  Your initial visit will be with a medical weight management provider who will help to understand your weight loss goals and ensure this program is the right fit for you. Please let our team know if you are interested in the 24 week plan by sending a message to your care team or calling [phone] to schedule.  _________________________________________________________________________________________________________________________________________________________  __________  [facility] Get Moving Program  Our team of physical therapists is trained to help you  understand and take control of your condition. They will perform a thorough evaluation to determine your ability for activity and develop a customized plan to fit your goals and physical ability.  Scheduling: Unsure if the Get Moving program is right for you? Discuss the program with your medical provider or diabetes educator. You can also call us at [phone] to ask questions or schedule an appointment.   [facility] Get Moving Program  ____________________________________________________________________________________________________________________________________________________________________________  [facility] Diabetes Prevention Program (DPP)  If you have prediabetes and Medicare please contact us via GlobeInhart to learn more about the Diabetes Prevention Program (DPP)  Program Details:  [facility] offers the year-long Diabetes Prevention Program (DPP). The program helps you to make lifestyle changes that prevent or delay type 2 diabetes by supporting healthy eating, increased physical activity, stress reduction and use of coping skills.   On average, previous [facility] DPP cohorts have lost and maintained at least 5% of their starting weight throughout the program and averaged more than 150 minutes of physical activity per week.  Participants meet weekly for one-hour group sessions over sixteen weeks, every other week for the next 8 weeks, and monthly for the last six months.   A year-long maintenance program is also available for participants who complete the first year.   Location & Cost:   During the COVID-19 Public Health Emergency, the program is offered virtually. When in-person classes can resume, they will be held at [facility].  For people with Medicare, the program is covered in full. A self-pay option will also be available for those with non-Medicare insurance plans.    ______________________________________________________________________________________________________________________________________________________________________________________________________________________________    To work with a Behavioral Health Psychologist:    Call to schedule:    Terence Vernon - [phone]  Jose Ha - [phone]  Chikis Patel - [phone]  Tonya Maddox - [phone]   Kaylee Nolasco PhD (cannot accept Medicare) [phone]        Thank ILSA welch [facility] Comprehensive Weight Management Team

## 2023-02-23 NOTE — PROGRESS NOTES
Return Medical Weight Management Note     Rosaura Rosales  MRN:  [id number]  :  1985  BERENICE:  2023    Dear Mayte Atwood DO,    I had the pleasure of seeing your patient Rosaura Rosales. She is a 37 year old female who I am continuing to see for treatment of obesity related to:       2022   I have the following health issues associated with obesity: High Cholesterol   I have the following symptoms associated with obesity: Back Pain, Fatigue       Assessment & Plan   Problem List Items Addressed This Visit        Other    Overweight with body mass index (BMI) of 29 to 29.9 in adult - Primary     Doing well on wegovy. No adverse side effects. Less effective in the last couple months. Would like to try to go up on the dose.     Continues to focus on protein and hydration.     Plan:  Pay attention to hydration  Increase to 1mg wegovy x 1-2 months   Then go up to 1.7mg wegovy   Follow up 3-4 months          Relevant Medications    Semaglutide-Weight Management (WEGOVY) 1 MG/0.5ML SOAJ    Semaglutide-Weight Management (WEGOVY) 1.7 MG/0.75ML SOAJ        INTERVAL HISTORY:  New MW 2022       Anti-obesity medications:     Current:   wegovy - no adverse side effects. Less effective over time. Earlier satiety and less hunger over time   More control over eating decisions     Failed/contraindicated:   Phentermine- some insomnia     Recent diet changes:   Working on more protein   Working on hydration     Recent exercise/activity changes:   Walking more       CURRENT WEIGHT:   173 lbs 0 oz    Initial Weight (lbs): 199 lbs  Last Visits Weight: 81.6 kg (180 lb)  Cumulative weight loss (lbs): 26  Weight Loss Percentage: 13.07%    Changes and Difficulties 2023   I have made the following changes to my diet since my last visit: more protein and vegetables   With regards to my diet, I am still struggling with: n/a   I have made the following changes to my activity/exercise since my last visit:  walked 3-5 days a week   With regards to my activity/exercise, I am still struggling with: having the energy to work out         MEDICATIONS:   Current Outpatient Medications   Medication Sig Dispense Refill     Semaglutide-Weight Management (WEGOVY) 1 MG/0.5ML SOAJ Inject 1 mg Subcutaneous every 7 days 2 mL 1     Semaglutide-Weight Management (WEGOVY) 1.7 MG/0.75ML SOAJ Inject 1.7 mg Subcutaneous every 7 days 3 mL 2     gabapentin (NEURONTIN) 300 MG capsule Take 1 capsule (300 mg) by mouth nightly as needed for neuropathic pain 30 capsule 1     hydrOXYzine (ATARAX) 25 MG tablet Take 1 tablet (25 mg) by mouth 2 times daily as needed for itching 30 tablet 1     zolpidem (AMBIEN) 5 MG tablet TAKE 1 TABLET BY MOUTH NIGHTLY AS NEEDED FOR SLEEP 30 tablet 0       Weight Loss Medication History Reviewed With Patient 2/23/2023   Which weight loss medications are you currently taking on a regular basis?  Wegovy   Are you having any side effects from the weight loss medication that we have prescribed you? No       Lab on 02/10/2023   Component Date Value Ref Range Status     HIV Antigen Antibody Combo 02/10/2023 Nonreactive  Nonreactive Final    HIV-1 p24 Ag & HIV-1/HIV-2 Ab Not Detected     Hepatitis B Surface Antigen 02/10/2023 Nonreactive  Nonreactive Final     Hepatitis C Antibody 02/10/2023 Nonreactive  Nonreactive Final     Neisseria gonorrhoeae 02/10/2023 Negative  Negative Final    Negative for N. gonorrhoeae rRNA by transcription mediated amplification. A negative result by transcription mediated amplification does not preclude the presence of C. trachomatis infection because results are dependent on proper and adequate collection, absence of inhibitors and sufficient rRNA to be detected.     Chlamydia trachomatis 02/10/2023 Negative  Negative Final    A negative result by transcription mediated amplification does not preclude the presence of C. trachomatis infection because results are dependent on proper and  adequate collection, absence of inhibitors and sufficient rRNA to be detected.     Treponema Antibody Total 02/10/2023 Nonreactive  Nonreactive Final     Trichomonas 02/10/2023 Absent  Absent Final     Yeast 02/10/2023 Absent  Absent Final     Clue Cells 02/10/2023 Absent  Absent Final     WBCs/high power field 02/10/2023 1+ (A)  None Final       PHYSICAL EXAM:  Objective    Wt 78.5 kg (173 lb)   BMI 28.79 kg/m             Vitals:  No vitals were obtained today due to virtual visit.    GENERAL: Healthy, alert and no distress  EYES: Eyes grossly normal to inspection.  No discharge or erythema, or obvious scleral/conjunctival abnormalities.  RESP: No audible wheeze, cough, or visible cyanosis.  No visible retractions or increased work of breathing.    SKIN: Visible skin clear. No significant rash, abnormal pigmentation or lesions.  NEURO: Cranial nerves grossly intact.  Mentation and speech appropriate for age.  PSYCH: Mentation appears normal, affect normal/bright, judgement and insight intact, normal speech and appearance well-groomed.        Sincerely,    Marialuisa Montoya NP      10 minutes spent on the date of the encounter doing chart review, history and exam, documentation and further activities per the note

## 2023-02-23 NOTE — LETTER
2023       RE: Rosaura Rosales  [address]  [place] [address]     Dear Colleague,    Thank you for referring your patient, Rosaura Rosales, to the [facility] [place] [facility]. Please see a copy of my visit note below.    Virtual Visit Check-In    During this virtual visit the patient is located in [place], patient verifies this as the location during the entirety of this visit.     Rosaura is a 37 year old who is being evaluated via a billable video visit.      How would you like to obtain your AVS? MyChart  If the video visit is dropped, the invitation should be resent by: Text to cell phone: [phone]  Will anyone else be joining your video visit? No        Video-Visit Details    Type of service:  Video Visit     Originating Location (pt. Location): Home    Distant Location (provider location):  Off-site  Platform used for Video Visit: Canvas         Video Start Time: 1030  Video End Time:1035    BRENNAN Palma              Return Medical Weight Management Note     Rosaura Rosales  MRN:  [id number]  :  1985  BERENICE:  2023    Dear Mayte Atwood, ,    I had the pleasure of seeing your patient Rosaura Rosales. She is a 37 year old female who I am continuing to see for treatment of obesity related to:       2022   I have the following health issues associated with obesity: High Cholesterol   I have the following symptoms associated with obesity: Back Pain, Fatigue       Assessment & Plan   Problem List Items Addressed This Visit        Other    Overweight with body mass index (BMI) of 29 to 29.9 in adult - Primary     Doing well on wegovy. No adverse side effects. Less effective in the last couple months. Would like to try to go up on the dose.     Continues to focus on protein and hydration.     Plan:  Pay attention to hydration  Increase to 1mg wegovy x 1-2 months   Then go up to  1.7mg wegovy   Follow up 3-4 months          Relevant Medications    Semaglutide-Weight Management (WEGOVY) 1 MG/0.5ML SOAJ    Semaglutide-Weight Management (WEGOVY) 1.7 MG/0.75ML SOAJ        INTERVAL HISTORY:  New MWM 12/30/2022       Anti-obesity medications:     Current:   wegovy - no adverse side effects. Less effective over time. Earlier satiety and less hunger over time   More control over eating decisions     Failed/contraindicated:   Phentermine- some insomnia     Recent diet changes:   Working on more protein   Working on hydration     Recent exercise/activity changes:   Walking more       CURRENT WEIGHT:   173 lbs 0 oz    Initial Weight (lbs): 199 lbs  Last Visits Weight: 81.6 kg (180 lb)  Cumulative weight loss (lbs): 26  Weight Loss Percentage: 13.07%    Changes and Difficulties 2/23/2023   I have made the following changes to my diet since my last visit: more protein and vegetables   With regards to my diet, I am still struggling with: n/a   I have made the following changes to my activity/exercise since my last visit: walked 3-5 days a week   With regards to my activity/exercise, I am still struggling with: having the energy to work out         MEDICATIONS:   Current Outpatient Medications   Medication Sig Dispense Refill     Semaglutide-Weight Management (WEGOVY) 1 MG/0.5ML SOAJ Inject 1 mg Subcutaneous every 7 days 2 mL 1     Semaglutide-Weight Management (WEGOVY) 1.7 MG/0.75ML SOAJ Inject 1.7 mg Subcutaneous every 7 days 3 mL 2     gabapentin (NEURONTIN) 300 MG capsule Take 1 capsule (300 mg) by mouth nightly as needed for neuropathic pain 30 capsule 1     hydrOXYzine (ATARAX) 25 MG tablet Take 1 tablet (25 mg) by mouth 2 times daily as needed for itching 30 tablet 1     zolpidem (AMBIEN) 5 MG tablet TAKE 1 TABLET BY MOUTH NIGHTLY AS NEEDED FOR SLEEP 30 tablet 0       Weight Loss Medication History Reviewed With Patient 2/23/2023   Which weight loss medications are you currently taking on a regular  basis?  Wegovy   Are you having any side effects from the weight loss medication that we have prescribed you? No       Lab on 02/10/2023   Component Date Value Ref Range Status     HIV Antigen Antibody Combo 02/10/2023 Nonreactive  Nonreactive Final    HIV-1 p24 Ag & HIV-1/HIV-2 Ab Not Detected     Hepatitis B Surface Antigen 02/10/2023 Nonreactive  Nonreactive Final     Hepatitis C Antibody 02/10/2023 Nonreactive  Nonreactive Final     Neisseria gonorrhoeae 02/10/2023 Negative  Negative Final    Negative for N. gonorrhoeae rRNA by transcription mediated amplification. A negative result by transcription mediated amplification does not preclude the presence of C. trachomatis infection because results are dependent on proper and adequate collection, absence of inhibitors and sufficient rRNA to be detected.     Chlamydia trachomatis 02/10/2023 Negative  Negative Final    A negative result by transcription mediated amplification does not preclude the presence of C. trachomatis infection because results are dependent on proper and adequate collection, absence of inhibitors and sufficient rRNA to be detected.     Treponema Antibody Total 02/10/2023 Nonreactive  Nonreactive Final     Trichomonas 02/10/2023 Absent  Absent Final     Yeast 02/10/2023 Absent  Absent Final     Clue Cells 02/10/2023 Absent  Absent Final     WBCs/high power field 02/10/2023 1+ (A)  None Final       PHYSICAL EXAM:  Objective     Wt 78.5 kg (173 lb)   BMI 28.79 kg/m             Vitals:  No vitals were obtained today due to virtual visit.    GENERAL: Healthy, alert and no distress  EYES: Eyes grossly normal to inspection.  No discharge or erythema, or obvious scleral/conjunctival abnormalities.  RESP: No audible wheeze, cough, or visible cyanosis.  No visible retractions or increased work of breathing.    SKIN: Visible skin clear. No significant rash, abnormal pigmentation or lesions.  NEURO: Cranial nerves grossly intact.  Mentation and speech  appropriate for age.  PSYCH: Mentation appears normal, affect normal/bright, judgement and insight intact, normal speech and appearance well-groomed.        Sincerely,    Marialuisa Montoya NP      10 minutes spent on the date of the encounter doing chart review, history and exam, documentation and further activities per the note

## 2023-02-23 NOTE — ASSESSMENT & PLAN NOTE
Doing well on wegovy. No adverse side effects. Less effective in the last couple months. Would like to try to go up on the dose.     Continues to focus on protein and hydration.     Plan:  Pay attention to hydration  Increase to 1mg wegovy x 1-2 months   Then go up to 1.7mg wegovy   Follow up 3-4 months

## 2023-02-24 ENCOUNTER — TELEPHONE (OUTPATIENT)
Dept: ENDOCRINOLOGY | Facility: CLINIC | Age: 38
End: 2023-02-24
Payer: COMMERCIAL

## 2023-02-24 NOTE — TELEPHONE ENCOUNTER
"CHRIS and jayleen sent    Schedule:  \"Video visit return\" with Marialuisa Montoya for 3 mo follow up (around 5/23/23)  "

## 2023-02-27 NOTE — TELEPHONE ENCOUNTER
Central Prior Authorization Team   Phone: [phone]      PA Initiation    Medication: Wegovy-PA initiated  Insurance Company: [employer] - Phone [phone] Fax [phone]  Pharmacy Filling the Rx: [facility] [place], [place] - [address] [place] [address]  Filling Pharmacy Phone: [phone]  Filling Pharmacy Fax:    Start Date: 2/27/2023

## 2023-03-01 NOTE — TELEPHONE ENCOUNTER
Prior Authorization Approval    Authorization Effective Date: 2/28/2023  Authorization Expiration Date: 5/29/2023  Medication: Wegovy-PA approved  Approved Dose/Quantity:   Reference #: [id number]   Insurance Company: [email or web address] [phone] Fax [phone]  Expected CoPay:       CoPay Card Available:      Foundation Assistance Needed:    Which Pharmacy is filling the prescription (Not needed for infusion/clinic administered): [facility] - [place], [place] - [address]  Pharmacy Notified: No  Patient Notified: No

## 2023-05-17 ENCOUNTER — TELEPHONE (OUTPATIENT)
Dept: ENDOCRINOLOGY | Facility: CLINIC | Age: 38
End: 2023-05-17
Payer: COMMERCIAL

## 2023-05-22 ENCOUNTER — TELEPHONE (OUTPATIENT)
Dept: ENDOCRINOLOGY | Facility: CLINIC | Age: 38
End: 2023-05-22
Payer: COMMERCIAL

## 2023-05-22 NOTE — TELEPHONE ENCOUNTER
PA Initiation    Medication: WEGOVY 1.7 MG/0.75ML SC SOAJ  Insurance Company: [employer] - Phone [phone] Fax [phone]  Pharmacy Filling the Rx:    Filling Pharmacy Phone:    Filling Pharmacy Fax:    Start Date:        PATIENT MESSAGED BACK AND CURRENT WEIGHT 170 LBS

## 2023-05-22 NOTE — TELEPHONE ENCOUNTER
Need patients current weight. I have sent her a [employer] message. Once she has responded I will get PA finished up

## 2023-05-24 NOTE — TELEPHONE ENCOUNTER
Prior Authorization Approval    Medication: WEGOVY 1.7 MG/0.75ML SC SOAJ  Authorization Effective Date: 5/23/2023  Authorization Expiration Date: 5/22/2024  Approved Dose/Quantity: 1 month  Reference #: [id number] [id number]   Insurance Company: [employer] - Phone [phone] Fax [phone]  Expected CoPay:       CoPay Card Available:      Financial Assistance Needed: N/A  Which Pharmacy is filling the prescription: [facility] - [place], [place] - [address] [place] [address]  Pharmacy Notified:    Patient Notified:         no

## 2023-07-23 DIAGNOSIS — E66.3 OVERWEIGHT WITH BODY MASS INDEX (BMI) OF 29 TO 29.9 IN ADULT: ICD-10-CM

## 2023-07-23 RX ORDER — SEMAGLUTIDE 1.7 MG/.75ML
1.7 INJECTION, SOLUTION SUBCUTANEOUS
Qty: 3 ML | Refills: 2 | Status: CANCELLED | OUTPATIENT
Start: 2023-07-23

## 2023-07-24 ENCOUNTER — TELEPHONE (OUTPATIENT)
Dept: ENDOCRINOLOGY | Facility: CLINIC | Age: 38
End: 2023-07-24
Payer: COMMERCIAL

## 2023-07-24 NOTE — TELEPHONE ENCOUNTER
Semaglutide-Weight Management (WEGOVY) 1.7 MG/0.75ML pen      Last Written Prescription Date:  2-23-23  Last Fill Quantity: 3 ml,   # refills: 2  Last Office Visit : 2-23-23  Future Office visit:  10-5-23  Last  Clinic note: RTC 3-4 Months    Outside lab 9-19-22  Creatinine 0.52 - 1.04 mg/dL 0.73            Routing refill request to provider for review/approval because:  Next appt outside of RTC timeframe  Overdue Wt Mgmt lab:Cr      2nd request pt call 7-24-23, sending high priority    Pt call comment:  When did you use the medication last? Missed yesterday's as out    Do you have any questions or concerns?  Yes:   Please send [employer] message when refill is signed and ready (see 7/23 activity)      Could we send this information to you in [employer] or would you prefer to receive a phone call?:     Patient would like to be contacted via [employer]

## 2023-07-24 NOTE — TELEPHONE ENCOUNTER
Semaglutide-Weight Management (WEGOVY) 1.7 MG/0.75ML pen addressed in  7-23-23 RF encounter, sent as high priority

## 2023-07-24 NOTE — TELEPHONE ENCOUNTER
Medication Question or Refill    Contacts         Type Contact Phone/Fax    07/24/2023 12:55 PM CDT Phone (Incoming) Rosaura Rosales (Self) [phone] (H)            What medication are you calling about (include dose and sig)?:     Semaglutide-Weight Management (WEGOVY) 1.7 MG/0.75ML pen     Preferred Pharmacy:    [facility] - [place], [place] - [address] [place] [id number]  Phone: [phone] Fax: [phone] Alternate Fax: [phone], [phone]      Controlled Substance Agreement on file:   CSA -- Patient Level:    CSA: None found at the patient level.       Who prescribed the medication?: Lindsay    Do you need a refill? Yes    When did you use the medication last? Missed yesterday's as out    Patient offered an appointment? Yes:  October    Do you have any questions or concerns?  Yes:   Please send Finale Desserts message when refill is signed and ready (see 7/23 activity)      Could we send this information to you in WiN MS or would you prefer to receive a phone call?:   Patient would like to be contacted via WiN MS

## 2023-07-25 RX ORDER — SEMAGLUTIDE 1.7 MG/.75ML
1.7 INJECTION, SOLUTION SUBCUTANEOUS
Qty: 3 ML | Refills: 0 | Status: SHIPPED | OUTPATIENT
Start: 2023-07-25 | End: 2023-08-25

## 2023-07-25 NOTE — TELEPHONE ENCOUNTER
Patient confirmed that she is taking Wegovy 1.7mg dose and tolerating ok. Still having some mild constipation. Missed last dose, due two days ago. Requesting to repeat dose for now. Appointment with Marialuisa Montoya CNP in October. Routed to RN/GEOVANNI for sign off.

## 2023-08-14 ENCOUNTER — OFFICE VISIT (OUTPATIENT)
Dept: OBGYN | Facility: CLINIC | Age: 38
End: 2023-08-14
Payer: COMMERCIAL

## 2023-08-14 VITALS
DIASTOLIC BLOOD PRESSURE: 70 MMHG | HEIGHT: 65 IN | SYSTOLIC BLOOD PRESSURE: 110 MMHG | WEIGHT: 156.5 LBS | BODY MASS INDEX: 26.08 KG/M2

## 2023-08-14 DIAGNOSIS — Z01.419 ENCOUNTER FOR GYNECOLOGICAL EXAMINATION (GENERAL) (ROUTINE) WITHOUT ABNORMAL FINDINGS: Primary | ICD-10-CM

## 2023-08-14 DIAGNOSIS — Z30.430 ENCOUNTER FOR IUD INSERTION: ICD-10-CM

## 2023-08-14 PROCEDURE — 58300 INSERT INTRAUTERINE DEVICE: CPT | Performed by: FAMILY MEDICINE

## 2023-08-14 PROCEDURE — 99395 PREV VISIT EST AGE 18-39: CPT | Mod: 25 | Performed by: FAMILY MEDICINE

## 2023-08-14 PROCEDURE — 87491 CHLMYD TRACH DNA AMP PROBE: CPT | Performed by: FAMILY MEDICINE

## 2023-08-14 PROCEDURE — 87591 N.GONORRHOEAE DNA AMP PROB: CPT | Performed by: FAMILY MEDICINE

## 2023-08-14 NOTE — PROGRESS NOTES
"SUBJECTIVE:  Rosaura Rosales is an 38 year old  woman who presents for   annual gyn exam. No LMP recorded. Periods are regular q 28-30 days, lasting   3 days. Dysmenorrhea:none. Cyclic symptoms   include none. No intermenstrual bleeding,   spotting, or discharge.  Menarche age [age].  STD hx: none.  Here for IUD placement as well     Current contraception: vasectomy  TERE exposure: no  History of abnormal Pap smear: No  Family history of uterine or ovarian cancer: No  Regular self breast exam: Yes  History of abnormal mammogram: No  Family history of breast cancer: No  History of abnormal lipids: No    No past medical history on file.     No family history on file.    No past surgical history on file.    Current Outpatient Medications   Medication    diazepam (VALIUM) 10 MG tablet    gabapentin (NEURONTIN) 300 MG capsule    hydrOXYzine (ATARAX) 25 MG tablet    Semaglutide-Weight Management (WEGOVY) 1.7 MG/0.75ML pen    zolpidem (AMBIEN) 5 MG tablet     No current facility-administered medications for this visit.     No Known Allergies    Social History     Tobacco Use    Smoking status: Never    Smokeless tobacco: Never   Substance Use Topics    Alcohol use: Not on file       Review Of Systems  Ears/Nose/Throat: negative  Respiratory: No shortness of breath, dyspnea on exertion, cough, or hemoptysis  Cardiovascular: negative  Gastrointestinal: negative  Genitourinary: See HPI   Constitutional, HEENT, cardiovascular, pulmonary, GI, , musculoskeletal, neuro, skin, endocrine and psych systems are negative, except as otherwise noted.      OBJECTIVE:  /70   Ht 1.651 m (5' 5\")   Wt 71 kg (156 lb 8 oz)   BMI 26.04 kg/m         General appearance: healthy, alert and no distress  Skin: Skin color, texture, turgor normal. No rashes or lesions.  Ears: negative  Nose/Sinuses: Nares normal. Septum midline. Mucosa normal. No drainage or sinus tenderness.  Oropharynx: Lips, mucosa, and tongue normal. Teeth and " gums normal.  Neck: Neck supple. No adenopathy. Thyroid symmetric, normal size,, Carotids without bruits.  Lungs: negative, Percussion normal. Good diaphragmatic excursion. Lungs clear  Heart: negative, PMI normal. No lifts, heaves, or thrills. RRR. No murmurs, clicks gallops or rub  Breasts: Inspection negative. No nipple discharge or bleeding. No masses.  Abdomen: Abdomen soft, non-tender. BS normal. No masses, organomegaly  Pelvic: Pelvic:  Pelvic examination with pap/ Gonorrhea and Chlamydia   including  External genitalia normal   and vagina normal rugatted not atrophic  Examination of urethra  normal no masses, tenderness, scarring  bladder, no masses or tenderness  Cervix no lesions or discharge    Bimanual exam with   Uterus 9 weeks size, mid position, mobile,no-tenderness, normal no descent   Adnexa/parametria   normal no      Procedure:    After obtaining informed consent an exam was done.  TIME OUT DONE PRIOR TO PROCEDURE:   STATING PROCEDURE NAME AND NEGATIVE URINE PREGNANCY TEST    Cervical cultures taken, the pt prepped in the usual sterile fashion, the anterior lip of the cervix grasped w a tenaculum and the uterus is sounded with endometrial pipelle to 8 cm, then a  mirena  or paragard IUD placed without concerns or complications @ 7 cm, the string trimmed to approx 1 inch from the os and the speculum removed.  The pt was instructed as to the signs of complications as well as how to check her string.  She will RTC after her next menses for a recheck or prn concerns.        ASSESSMENT:  Rosaura Rosales is an 38 year old  woman who presents for   annual gyn exam.     PLAN:  Dx:  1)  Pap smear due 2025  Gonorrhea  Chlamydia completed   2)  Mammography not due , lipids at appropriate intervals ordered    Colonoscopy not due   3)  Contraception: Mirena as placed, return in 4 weeks for IUD check   4)  Overweight: BMI decreased from 33 to 26, on wegovy.       PE:  Reviewed health maintenance  including diet, regular exercise   and periodic exams.    Dr. Mayte Atwood, DO    Obstetrics and Gynecology  [facility] - [place] and [place]

## 2023-08-14 NOTE — PATIENT INSTRUCTIONS
return in 4 weeks for IUD check   For fasting labs (for cholesterol):  please Call Lab [phone] [place] or [phone] [place] to schedule labs on a future day   You may also schedule the labs at any [place] facitily.      Dr. Mayte Atwood, DO    Obstetrics and Gynecology  [facility] - [place] and [place]

## 2023-08-14 NOTE — NURSING NOTE
"Chief Complaint   Patient presents with    Gyn Exam    IUD       Initial /70   Ht 1.651 m (5' 5\")   Wt 71 kg (156 lb 8 oz)   BMI 26.04 kg/m   Estimated body mass index is 26.04 kg/m  as calculated from the following:    Height as of this encounter: 1.651 m (5' 5\").    Weight as of this encounter: 71 kg (156 lb 8 oz).  BP completed using cuff size: regular    Questioned patient about current smoking habits.  Pt. has never smoked.          The following HM Due: NONE    "

## 2023-08-16 LAB
C TRACH DNA SPEC QL NAA+PROBE: NEGATIVE
N GONORRHOEA DNA SPEC QL NAA+PROBE: NEGATIVE

## 2023-08-24 ENCOUNTER — TELEPHONE (OUTPATIENT)
Dept: ENDOCRINOLOGY | Facility: CLINIC | Age: 38
End: 2023-08-24
Payer: COMMERCIAL

## 2023-08-24 NOTE — TELEPHONE ENCOUNTER
Patient calling to check on her Wegovy prescription. Patient stated she's been talking to Candie about it.  Inject 1.7 mg Subcutaneous every 7 days, Disp-3 mL, R-0, E-Prescribe   Dispense: 3 mL   Refills: 0 ordered   Pharmacy: [facility] - [place], [place] - [address] [place] [address] (Ph: [phone])   Order Details  Ordered on: 07/25/23   Associated Dx: Overweight with body mass index (BMI) of 29 to 29.9 in adult   Authorizing provider: Marialuisa Montoya NP         [phone] okay to leave VM

## 2023-08-25 ENCOUNTER — MYC REFILL (OUTPATIENT)
Dept: ENDOCRINOLOGY | Facility: CLINIC | Age: 38
End: 2023-08-25
Payer: COMMERCIAL

## 2023-08-25 DIAGNOSIS — E66.3 OVERWEIGHT WITH BODY MASS INDEX (BMI) OF 29 TO 29.9 IN ADULT: ICD-10-CM

## 2023-08-28 RX ORDER — SEMAGLUTIDE 1.7 MG/.75ML
1.7 INJECTION, SOLUTION SUBCUTANEOUS
Qty: 3 ML | Refills: 0 | OUTPATIENT
Start: 2023-08-28

## 2023-08-28 RX ORDER — SEMAGLUTIDE 1.7 MG/.75ML
1.7 INJECTION, SOLUTION SUBCUTANEOUS
Qty: 3 ML | Refills: 0 | Status: SHIPPED | OUTPATIENT
Start: 2023-08-28

## 2023-08-28 NOTE — TELEPHONE ENCOUNTER
Semaglutide-Weight Management (WEGOVY) 1.7 MG/0.75ML pen     3 mL 0 8/28/2023         Last Office Visit : 2-  Future Office visit:    10-5-2023

## 2023-10-05 ENCOUNTER — TELEPHONE (OUTPATIENT)
Dept: ENDOCRINOLOGY | Facility: CLINIC | Age: 38
End: 2023-10-05

## 2023-10-05 ENCOUNTER — VIRTUAL VISIT (OUTPATIENT)
Dept: ENDOCRINOLOGY | Facility: CLINIC | Age: 38
End: 2023-10-05
Payer: COMMERCIAL

## 2023-10-05 VITALS — BODY MASS INDEX: 24.96 KG/M2 | WEIGHT: 150 LBS

## 2023-10-05 DIAGNOSIS — Z86.39 HISTORY OF MORBID OBESITY: Primary | ICD-10-CM

## 2023-10-05 PROCEDURE — 99213 OFFICE O/P EST LOW 20 MIN: CPT | Mod: VID | Performed by: NURSE PRACTITIONER

## 2023-10-05 NOTE — PROGRESS NOTES
Return Medical Weight Management Note     Rosaura Rosales  MRN:  [id number]  :  1985  BERENICE:  10/5/2023    Dear Mayte Atwood, ,    I had the pleasure of seeing your patient Rosaura Rosales. She is a 38 year old female who I am continuing to see for treatment of obesity related to:        2022    12:00 PM   --   I have the following health issues associated with obesity High Cholesterol   I have the following symptoms associated with obesity Back Pain    Fatigue       Assessment & Plan   Problem List Items Addressed This Visit        Other    History of morbid obesity - Primary     Tolerating wegovy 2.4mg well. No adverse side effects. Feels good about current plan. Has been able to incorporate adequate protein and is mindful about hydration. Walking more and considering increasing activity level. Will continue current plan.     Continue wegovy 2.4mg  Great work with hydration and protein!  Continue to consider ways to stay active! Goal would be an enjoyable regular activity and a couple days of strength training weekly  Follow up in 3-4 months                INTERVAL HISTORY:  New [facility]WM 2022- BMI 29 with HLD and back pain. Starting BMI of 33 with OB 2022.  Started wegovy. Last seen 2023 and started tapering up to 1.7mg, increasing to 2.4mg 2023.     Was 130-140 where she was prebaby- would like to get to there     Anti-obesity medications:     Current:   wegovy 2.4mg - no adverse     Failed/contraindicated:   Phentermine- insomnia     Recent diet changes:   Smaller portions   Less snacking   Protein shake daily   Protein at each episode     Recent exercise/activity changes: walking the dog a lot     Recent stressors: manageable     Recent sleep changes: manageable     Vitamins/Labs: due - ordered by PCP     CURRENT WEIGHT:   150 lbs 0 oz    Initial Weight (lbs): 199 lbs  Last Visits Weight: 78.5 kg (173 lb)  Cumulative weight loss (lbs): 49  Weight Loss Percentage: 24.62%         2/23/2023    10:01 AM   Changes and Difficulties   I have made the following changes to my diet since my last visit: more protein and vegetables   With regards to my diet, I am still struggling with: n/a   I have made the following changes to my activity/exercise since my last visit: walked 3-5 days a week   With regards to my activity/exercise, I am still struggling with: having the energy to work out         MEDICATIONS:   Current Outpatient Medications   Medication Sig Dispense Refill     diazepam (VALIUM) 10 MG tablet Take 1 tablet (10 mg) by mouth every 6 hours as needed for anxiety (Patient not taking: Reported on 8/14/2023) 1 tablet 0     gabapentin (NEURONTIN) 300 MG capsule Take 1 capsule (300 mg) by mouth nightly as needed for neuropathic pain 30 capsule 1     hydrOXYzine (ATARAX) 25 MG tablet Take 1 tablet (25 mg) by mouth 2 times daily as needed for itching 30 tablet 1     Semaglutide-Weight Management (WEGOVY) 1.7 MG/0.75ML pen Inject 1.7 mg Subcutaneous every 7 days 3 mL 0     Semaglutide-Weight Management (WEGOVY) 2.4 MG/0.75ML pen Inject 2.4 mg Subcutaneous once a week 3 mL 3     zolpidem (AMBIEN) 5 MG tablet TAKE 1 TABLET BY MOUTH NIGHTLY AS NEEDED FOR SLEEP 30 tablet 0           2/23/2023    10:01 AM   Weight Loss Medication History Reviewed With Patient   Which weight loss medications are you currently taking on a regular basis? Wegovy           PHYSICAL EXAM:  Objective    Wt 68 kg (150 lb)   BMI 24.96 kg/m             Vitals:  No vitals were obtained today due to virtual visit.    GENERAL: Healthy, alert and no distress  EYES: Eyes grossly normal to inspection.  No discharge or erythema, or obvious scleral/conjunctival abnormalities.  RESP: No audible wheeze, cough, or visible cyanosis.  No visible retractions or increased work of breathing.    SKIN: Visible skin clear. No significant rash, abnormal pigmentation or lesions.  NEURO: Cranial nerves grossly intact.  Mentation and speech  appropriate for age.  PSYCH: Mentation appears normal, affect normal/bright, judgement and insight intact, normal speech and appearance well-groomed.        Sincerely,    Marialuisa Montoya NP      20 minutes spent by me on the date of the encounter doing chart review, history and exam, documentation and further activities per the note

## 2023-10-05 NOTE — PATIENT INSTRUCTIONS
"Harjinder Kaplan, it was nice to meet you today!  Thank you for allowing us the privilege of caring for you. We hope we provided you with the excellent service you deserve.   Please let us know if there is anything else we can do for you so that we can be sure you are completely satisfied with your care experience.    To ensure the quality of our services you may be receiving a patient satisfaction survey from an independent patient satisfaction monitoring company.    The greatest compliment you can give is a \"Likely to Recommend\"    Your visit was with Marialuisa Montoya NP today.    Instructions per today's visit:     Follow up  plan:  Continue wegovy 2.4mg  Great work with hydration and protein!  Continue to consider ways to stay active! Goal would be an enjoyable regular activity and a couple days of strength training weekly  Follow up in 3-4 months   ___________________________________________________________________________  Important contact and scheduling information:  Please call our contact center at [phone] to schedule your next appointments.  For any nursing questions or concerns call Candie Youssef LPN at [phone] or Mary Canchola RN at [phone]  Please call during clinic hours Monday through Friday 8:00a - 4:00p if you have questions or you can contact us via [employer] at anytime and we will reply during clinic hours.    Lab results will be communicated through My Chart or letter (if My Chart not used). Please call the clinic if you have not received communication after 1 week or if you have any questions.?  Clinic Fax: [phone]  __________________________________________________________________________    If labs were ordered today:    Please make an appointment to have them drawn at your convenience.     To schedule the Lab Appointment using Swiftype:  Select \"Schedule an Appointment\"  Select \"Lab Only\"  For \"A couple of questions\", select \"Other\"  For \"Which locations work for you?, select the " location and set up the appointment    To schedule by phone call [phone] to schedule a lab only appointment at any [facility] lab.  ___________________________________________________________________________  Work with A Health !  Virtual Sessions are Available through [facility] Weight Management Clinics    To learn more, call to schedule a free, Health  Q&A appointment: [phone]     What is Health Coaching?  Do you know what you are supposed to do, but you just aren't doing it?  Then, HEALTH COACHING may help you!   Get unstuck and move forward with the support of a professionally trained NBC-HWC (National Board-Certified Health and ) who uses evidence-based approaches to help you move forward with healthy lifestyle changes in the areas of weight loss, stress management and overall well-being.    Health Coaches help you identify goals that will work best for you. Health Coaches provide support and encouragement with overcoming barriers and help you to find inspiration and motivation to lead a healthy lifestyle.    Option one:  Health Coaching 3-Pack; Three, 30-minute Health Coaching Visits, for $99  Visits are done virtually (phone or video)  This is a self pay service; we do not accept insurance for aditya coaching.    Option two:   The 24 week Plan; 11 Health Coaching Visits, and a 7 months subscription to [employer]-- on-demand fitness, nutrition and mindfulness classes, for $499 (employee discounts may be available). Participants will also meet regularly with a weight management Medical Provider and a Registered/Licensed Dietician.  This is a self-pay service; we do not accept insurance for health coaching.    To Schedule a free Health  Q&A appointment to learn more,  call [phone].  ____________________________________________________________________    [facility]   Healthy Lifestyle Virtual Support  Group    Healthy Lifestyle Virtual Support Group?  This is 60 minutes of small group guided discussion, support and resources. All are welcome who want a healthy lifestyle.  WHEN: Starting in July 2023, this group meets the 1st Friday of the month from 12:30 PM - 1:30 PM virtually using Microsoft Teams.    FACILITATOR: Led by National Board Certified Health and , Dot Orellana, [facility].   TO REGISTER: Please send an email to Dot [email or web address] to receive monthly invites to the group or if you have any questions about having a health .  Prior to the meeting, a link with directions on how to join the meeting will be sent to you.    2023 Meetings  May 19: Let's Talk  June 9: Create Your Coaching Toolkit: Learn How to  Yourself  July 7: Let's Talk  August 4: Benefits of Fiber with SEAMUS Murray  September 1: Show and Tell (share your aps, podcasts, recipes, hacks, books)  October 6 :Let's Talk  November 3: Introduction to Mindfulness   December 1: Let's Talk    If you would like bariatric surgery specific support group info please let your care team know.         Thank you,   [facility] Comprehensive Weight Management Team

## 2023-10-05 NOTE — ASSESSMENT & PLAN NOTE
Tolerating wegovy 2.4mg well. No adverse side effects. Feels good about current plan. Has been able to incorporate adequate protein and is mindful about hydration. Walking more and considering increasing activity level. Will continue current plan.     Continue wegovy 2.4mg  Great work with hydration and protein!  Continue to consider ways to stay active! Goal would be an enjoyable regular activity and a couple days of strength training weekly  Follow up in 3-4 months

## 2023-10-05 NOTE — NURSING NOTE
Is the patient currently in the state of [place]? YES    Visit mode:VIDEO    If the visit is dropped, the patient can be reconnected by: VIDEO VISIT: Text to cell phone:   Telephone Information:   Mobile [phone]    and VIDEO VISIT: Send to e-mail at: [email or web address]    Will anyone else be joining the visit? NO  (If patient encounters technical issues they should call [phone])    How would you like to obtain your AVS? MyChart    Are changes needed to the allergy or medication list? No    Reason for visit: DISHA Escamilla VVF    Unable to complete QNRs with pt via phone due to pt being in work meeting until 8.

## 2023-10-05 NOTE — LETTER
10/5/2023       RE: Rosaura Rosales  [address]  [place] [address]     Dear Colleague,    Thank you for referring your patient, Rosaura Rosales, to the [facility] WE[facility] MANAGEMENT CLINIC [place] at [facility]. Please see a copy of my visit note below.      Return Medical Weight Management Note     Rosaura Rosales  MRN:  [id number]  :  1985  BERENICE:  10/5/2023    Dear Mayte Atwood, ,    I had the pleasure of seeing your patient Rosaura Rosales. She is a 38 year old female who I am continuing to see for treatment of obesity related to:        2022    12:00 PM   --   I have the following health issues associated with obesity High Cholesterol   I have the following symptoms associated with obesity Back Pain    Fatigue       Assessment & Plan   Problem List Items Addressed This Visit          Other    History of morbid obesity - Primary     Tolerating wegovy 2.4mg well. No adverse side effects. Feels good about current plan. Has been able to incorporate adequate protein and is mindful about hydration. Walking more and considering increasing activity level. Will continue current plan.     Continue wegovy 2.4mg  Great work with hydration and protein!  Continue to consider ways to stay active! Goal would be an enjoyable regular activity and a couple days of strength training weekly  Follow up in 3-4 months                INTERVAL HISTORY:  [facility] 2022- BMI 29 with HLD and back pain. Starting BMI of 33 with OB 2022.  Started wegovy. Last seen 2023 and started tapering up to 1.7mg, increasing to 2.4mg 2023.     Was 130-140 where she was prebaby- would like to get to there     Anti-obesity medications:     Current:   wegovy 2.4mg - no adverse     Failed/contraindicated:   Phentermine- insomnia     Recent diet changes:   Smaller portions   Less snacking   Protein shake daily   Protein at each episode     Recent  exercise/activity changes: walking the dog a lot     Recent stressors: manageable     Recent sleep changes: manageable     Vitamins/Labs: due - ordered by PCP     CURRENT WEIGHT:   150 lbs 0 oz    Initial Weight (lbs): 199 lbs  Last Visits Weight: 78.5 kg (173 lb)  Cumulative weight loss (lbs): 49  Weight Loss Percentage: 24.62%        2/23/2023    10:01 AM   Changes and Difficulties   I have made the following changes to my diet since my last visit: more protein and vegetables   With regards to my diet, I am still struggling with: n/a   I have made the following changes to my activity/exercise since my last visit: walked 3-5 days a week   With regards to my activity/exercise, I am still struggling with: having the energy to work out         MEDICATIONS:   Current Outpatient Medications   Medication Sig Dispense Refill    diazepam (VALIUM) 10 MG tablet Take 1 tablet (10 mg) by mouth every 6 hours as needed for anxiety (Patient not taking: Reported on 8/14/2023) 1 tablet 0    gabapentin (NEURONTIN) 300 MG capsule Take 1 capsule (300 mg) by mouth nightly as needed for neuropathic pain 30 capsule 1    hydrOXYzine (ATARAX) 25 MG tablet Take 1 tablet (25 mg) by mouth 2 times daily as needed for itching 30 tablet 1    Semaglutide-Weight Management (WEGOVY) 1.7 MG/0.75ML pen Inject 1.7 mg Subcutaneous every 7 days 3 mL 0    Semaglutide-Weight Management (WEGOVY) 2.4 MG/0.75ML pen Inject 2.4 mg Subcutaneous once a week 3 mL 3    zolpidem (AMBIEN) 5 MG tablet TAKE 1 TABLET BY MOUTH NIGHTLY AS NEEDED FOR SLEEP 30 tablet 0           2/23/2023    10:01 AM   Weight Loss Medication History Reviewed With Patient   Which weight loss medications are you currently taking on a regular basis? Wegovy           PHYSICAL EXAM:  Objective    Wt 68 kg (150 lb)   BMI 24.96 kg/m             Vitals:  No vitals were obtained today due to virtual visit.    GENERAL: Healthy, alert and no distress  EYES: Eyes grossly normal to inspection.  No  discharge or erythema, or obvious scleral/conjunctival abnormalities.  RESP: No audible wheeze, cough, or visible cyanosis.  No visible retractions or increased work of breathing.    SKIN: Visible skin clear. No significant rash, abnormal pigmentation or lesions.  NEURO: Cranial nerves grossly intact.  Mentation and speech appropriate for age.  PSYCH: Mentation appears normal, affect normal/bright, judgement and insight intact, normal speech and appearance well-groomed.        Sincerely,    Marialuisa Montoya NP      20 minutes spent by me on the date of the encounter doing chart review, history and exam, documentation and further activities per the note

## 2023-11-28 DIAGNOSIS — E66.3 OVERWEIGHT WITH BODY MASS INDEX (BMI) OF 29 TO 29.9 IN ADULT: ICD-10-CM

## 2023-11-29 NOTE — TELEPHONE ENCOUNTER
Patient has appointment with Marialuisa Montoya CNP in February 2024. Last visit in October. Plan is shown below. Routed to GEOVANNI for sign off.     Continue wegovy 2.4mg  Great work with hydration and protein!  Continue to consider ways to stay active! Goal would be an enjoyable regular activity and a couple days of strength training weekly  Follow up in 3-4 months

## 2023-12-15 ENCOUNTER — VIRTUAL VISIT (OUTPATIENT)
Dept: ENDOCRINOLOGY | Facility: CLINIC | Age: 38
End: 2023-12-15
Payer: COMMERCIAL

## 2023-12-15 VITALS — BODY MASS INDEX: 23.66 KG/M2 | HEIGHT: 65 IN | WEIGHT: 142 LBS

## 2023-12-15 DIAGNOSIS — E78.5 DYSLIPIDEMIA: ICD-10-CM

## 2023-12-15 DIAGNOSIS — Z86.39 HISTORY OF MORBID OBESITY: Primary | ICD-10-CM

## 2023-12-15 PROCEDURE — 99214 OFFICE O/P EST MOD 30 MIN: CPT | Mod: VID | Performed by: NURSE PRACTITIONER

## 2023-12-15 RX ORDER — PHENTERMINE HYDROCHLORIDE 37.5 MG/1
0.5 TABLET ORAL EVERY MORNING
Qty: 15 TABLET | Refills: 1 | Status: SHIPPED | OUTPATIENT
Start: 2023-12-15

## 2023-12-15 RX ORDER — METFORMIN HCL 500 MG
TABLET, EXTENDED RELEASE 24 HR ORAL
Qty: 60 TABLET | Refills: 2 | Status: SHIPPED | OUTPATIENT
Start: 2023-12-15

## 2023-12-15 ASSESSMENT — PAIN SCALES - GENERAL: PAINLEVEL: NO PAIN (0)

## 2023-12-15 NOTE — LETTER
12/15/2023       RE: Rosaura Rosales  [address] [address]  Count [place] the [facility] [address]     Dear Colleague,    Thank you for referring your patient, Rosaura Rosales, to the [facility]NAG[facility] [place] [facility]. Please see a copy of my visit note below.      Return Medical Weight Management Note     Rosaura Rosales  MRN:  [id number]  :  1985  BERENICE:  12/15/2023    Dear Mayte Atwood DO,    I had the pleasure of seeing your patient Rosaura Rosales. She is a 38 year old female who I am continuing to see for treatment of obesity related to:        2022    12:00 PM   --   I have the following health issues associated with obesity High Cholesterol   I have the following symptoms associated with obesity Back Pain    Fatigue       Assessment & Plan  Problem List Items Addressed This Visit       Dyslipidemia    History of morbid obesity - Primary      Plan  Will unfortunately likely have to stop wegovy due to insurance ceasing coverage of this medication in .  Start Metformin 500mg - take 1 tablet with a meal once daily for 7 days, then increase to 2 tablets with a meal once daily.   Start phentermine 1/2 tab each morning   Can take remaining wegovy doses every other week if desired  Labs ordered in August, you will need to schedule those   Focus on maintaining protein, hydration, and fiber to help manage hunger while stopping the wegovy   Follow up with Marialuisa in 3 months     PHENTERMINE  No history of high blood pressure, no CV disease, no history of glaucoma  Has taken in the past experienced minor insomnia but would be alright with restarting this medication  On birth control  has vasectomy   .  METFORMIN  No history of chronic kidney disease  GFR >45  May be helpful in replacing the insulin sensitivity effects that Wegovy was having   .      INTERVAL HISTORY:  [facility] 2022- BMI 29 with HLD and back pain. Starting BMI  of 33 with OB 9/2022.  Started wegovy. 2/25/2023 started tapering up to 1.7mg, increasing to 2.4mg 8/25/2023.   On 10/05/23 was doing well on 2.4mg dose.      Was 130-140 where she was prebaby- would like to get to there     Wt Readings from Last 5 Encounters:   12/15/23 64.4 kg (142 lb)   10/05/23 68 kg (150 lb)   08/14/23 71 kg (156 lb 8 oz)   02/23/23 78.5 kg (173 lb)   12/30/22 81.6 kg (180 lb)       Anti-obesity medication history    Current:   Wegovy 2.4  Has seen really successful weight loss with minimal side effects. But insurance will not be covering it in 2024.    Past/Failed/contraindicated:   Phentermine: insomnia but would be open to restarting this.     Vitamins/Labs: Labs ordered     Pregnancy:  has vasectomy     CURRENT WEIGHT:   142 lbs 0 oz    Initial Weight (lbs): 199 lbs  Last Visits Weight: 68 kg (150 lb)  Cumulative weight loss (lbs): 57  Weight Loss Percentage: 28.64%        12/12/2023     7:31 PM   Changes and Difficulties   I have made the following changes to my diet since my last visit: eating less fats, drinking less pop   With regards to my diet, I am still struggling with: none   I have made the following changes to my activity/exercise since my last visit: increased activity   With regards to my activity/exercise, I am still struggling with: n/a             MEDICATIONS:   Current Outpatient Medications   Medication Sig Dispense Refill    Semaglutide-Weight Management (WEGOVY) 2.4 MG/0.75ML pen Inject 2.4 mg Subcutaneous once a week 3 mL 3    diazepam (VALIUM) 10 MG tablet Take 1 tablet (10 mg) by mouth every 6 hours as needed for anxiety (Patient not taking: Reported on 12/15/2023) 1 tablet 0    gabapentin (NEURONTIN) 300 MG capsule Take 1 capsule (300 mg) by mouth nightly as needed for neuropathic pain (Patient not taking: Reported on 12/15/2023) 30 capsule 1    hydrOXYzine (ATARAX) 25 MG tablet Take 1 tablet (25 mg) by mouth 2 times daily as needed for itching (Patient not  "taking: Reported on 12/15/2023) 30 tablet 1    Semaglutide-Weight Management (WEGOVY) 1.7 MG/0.75ML pen Inject 1.7 mg Subcutaneous every 7 days (Patient not taking: Reported on 12/15/2023) 3 mL 0    zolpidem (AMBIEN) 5 MG tablet TAKE 1 TABLET BY MOUTH NIGHTLY AS NEEDED FOR SLEEP (Patient not taking: Reported on 12/15/2023) 30 tablet 0           12/12/2023     7:31 PM   Weight Loss Medication History Reviewed With Patient   Which weight loss medications are you currently taking on a regular basis? Wegovy   Are you having any side effects from the weight loss medication that we have prescribed you? No            No data to display                  PHYSICAL EXAM:  Objective   Ht 1.651 m (5' 5\")   Wt 64.4 kg (142 lb)   BMI 23.63 kg/m      Vitals - Patient Reported  Pain Score: No Pain (0)      Vitals:  No vitals were obtained today due to virtual visit.    GENERAL: Healthy, alert and no distress  EYES: Eyes grossly normal to inspection.  No discharge or erythema, or obvious scleral/conjunctival abnormalities.  RESP: No audible wheeze, cough, or visible cyanosis.  No visible retractions or increased work of breathing.    SKIN: Visible skin clear. No significant rash, abnormal pigmentation or lesions.  NEURO: Cranial nerves grossly intact.  Mentation and speech appropriate for age.  PSYCH: Mentation appears normal, affect normal/bright, judgement and insight intact, normal speech and appearance well-groomed.        Sincerely,    Dayanna Salomon PA-C      35 minutes spent by me on the date of the encounter doing chart review, history and exam, documentation and further activities per the note     Attestation signed by Marialuisa Montoya NP at 12/15/2023  1:04 PM:  I have personally seen and examined patient with Dayanna Salomon PA-C and I agree with findings, assessments and plan as documented.    Marialuisa Montoya NP           Virtual Visit Details    Type of service:  Video Visit     Originating Location (pt. Location): " Home    Distant Location (provider location):  Off-site  Platform used for Video Visit: Cristine

## 2023-12-15 NOTE — PROGRESS NOTES
Virtual Visit Details    Type of service:  Video Visit     Originating Location (pt. Location): Home    Distant Location (provider location):  Off-site  Platform used for Video Visit: Cristine

## 2023-12-15 NOTE — PATIENT INSTRUCTIONS
"Thank you for allowing us the privilege of caring for you. We hope we provided you with the excellent service you deserve.   Please let us know if there is anything else we can do for you so that we can be sure you are completely satisfied with your care experience.    To ensure the quality of our services you may be receiving a patient satisfaction survey from an independent patient satisfaction monitoring company.    The greatest compliment you can give is a \"Likely to Recommend\"    Your visit was with Marialuisa Montoya CNP, and Dayanna Salomon PA-C today.    Instructions per today's visit:     Harjinder Rosales, it was great to visit with you today.  Here is a review of our visit.  If our clinic scheduler is not able to reach you please call [phone] to schedule your next appointments.    Plan  Will be stopping wegovy due to insurance ceasing coverage of this medication in 2024.  Start Metformin 500mg - take 1 tablet with a meal once daily for 7 days, then increase to 2 tablets with a meal once daily.   Start phentermine 1/2 tab each morning   Can take remaining wegovy doses every other week if desired  Labs ordered in August, you will need to schedule those   Focus on maintaining protein, hydration, and fiber to help manage hunger while stopping the wegovy   Follow up with Marialuisa in 3 months       Information about Video Visits with [employer]: video visit information  _________________________________________________________________________________________________________________________________________________________  If you are asked by your clinic team to have your blood pressure checked:  [facility] do offer several locations for blood pressure checks. Please follow the below link to schedule an appointment. Scheduling an appointment at the pharmacy for a blood pressure check is now preferred.    Appointment Plus " ([email or web address])  _________________________________________________________________________________________________________________________________________________________  Important contact and scheduling information:  Please call our contact center at [phone] to schedule your next appointments.  To find a lab location near you, please call [phone].  For any nursing questions or concerns call Candie Youssef LPN at [phone] or Mary Canchola RN at [phone]  Please call during clinic hours Monday through Friday 8:00a - 4:00p if you have questions or you can contact us via [employer] at anytime and we will reply during clinic hours.    Lab results will be communicated through My Chart or letter (if My Chart not used). Please call the clinic if you have not received communication after 1 week or if you have any questions.?  Clinic Fax: [phone]    _________________________________________________________________________________________________________________________________________________________  Meal Replacement Products:    Here is the link to our new e-store where you can purchase our meal replacement products    [facility] E-Store  [facility]/store    The one week starter kit is a great way to sample a variety of products and see what works for you.    If you want more information about the product go to: [employer] Meals  [email or web address]    If you are an employee or [facility] or [facility] please contact your care team for a 10% estore discount    Free Shipping for orders over $75     Benefits of meal replacements products:    Portion and calorie control  Improved nutrition  Structured eating  Simplified food choices  Avoid contact with trigger foods  _________________________________________________________________________________________________________________________________________________________  Interested in working with a health  ?  Health coaches work with you to improve your overall health and wellbeing.  They look at the whole person, and may involve discussion of different areas of life, including, but not limited to the four pillars of health (sleep, exercise, nutrition, and stress management). Discuss with your care team if you would like to start working a health .  Health Coaching-3 Pack: Schedule by calling [phone]    $99 for three health coaching visits    Visits may be done in person or via phone    Coaching is a partnership between the  and the client; Coaches do not prescribe or diagnose    Coaching helps inspire the client to reach his/her personal goals   _________________________________________________________________________________________________________________________________________________________  24 Week Healthy Lifestyle Plan:    Our mission in the 24-week Healthy Lifestyle Plan is to provide you with individualized care by giving you the tools, education and support you need to lose weight and maintain a healthy lifestyle. In your 24-week journey, you ll be supported by a dedicated weight loss team that includes registered dietitians, medical weight management providers, health coaches, and nurses -- all with special expertise in weight loss -- to help you every step of the way.     Monthly meetings with your registered dietician or medical weight management provider help to review your progress, update your care plan, and make any adjustments needed to ensure success. Between these visits, weekly and bi-weekly health  visits will help you focus on the four pillars of weight loss -- stress, sleep, nutrition, and exercise -- and how you can best adapt each to achieve sustainable weight loss results.    In addition, you will be given exclusive access to online wellbeing classes through [employer].  Your initial visit will be with a medical weight management provider who will help to  understand your weight loss goals and ensure this program is the right fit for you. Please let our team know if you are interested in the 24 week plan by sending a message to your care team or calling [phone] to schedule.  _________________________________________________________________________________________________________________________________________________________  __________  [facility] Get Moving Program  Our team of physical therapists is trained to help you understand and take control of your condition. They will perform a thorough evaluation to determine your ability for activity and develop a customized plan to fit your goals and physical ability.  Scheduling: Unsure if the Get Moving program is right for you? Discuss the program with your medical provider or diabetes educator. You can also call us at [phone] to ask questions or schedule an appointment.   [facility] Get Moving Program  ____________________________________________________________________________________________________________________________________________________________________________  [facility] Diabetes Prevention Program (DPP)  If you have prediabetes and Medicare please contact us via [employer] to learn more about the Diabetes Prevention Program (DPP)  Program Details:   [facility] offers the year-long Diabetes Prevention Program (DPP). The program helps you to make lifestyle changes that prevent or delay type 2 diabetes by supporting healthy eating, increased physical activity, stress reduction and use of coping skills.   On average, previous [facility] and [facility] DPP cohorts have lost and maintained at least 5% of their starting weight throughout the program and averaged more than 150 minutes of physical activity per week.  Participants meet weekly for one-hour group sessions over sixteen weeks, every other week for the next 8 weeks, and monthly for the last six months.   A year-long  maintenance program is also available for participants who complete the first year.   Location & Cost:   During the COVID-19 Public Health Emergency, the program is offered virtually. When in-person classes can resume, they will be held at [facility].  For people with Medicare, the program is covered in full. A self-pay option will also be available for those with non-Medicare insurance plans.   ______________________________________________________________________________________________________________________________________________________________________________________________________________________________    To work with a Behavioral Health Psychologist:    Call to schedule:    Terence Vernon - [phone]  Jose Ha - [phone]  Chikis Patel - [phone]  Tonya Maddox - [phone]   Kaylee Nolasco PhD (cannot accept Medicare) [phone]        Thank you,   [facility] Comprehensive Weight Management Team

## 2023-12-15 NOTE — PROGRESS NOTES
Return Medical Weight Management Note     Rosaura Rosales  MRN:  [id number]  :  1985  BERENICE:  12/15/2023    Dear Mayte Atwood, ,    I had the pleasure of seeing your patient Rosaura Rosales. She is a 38 year old female who I am continuing to see for treatment of obesity related to:        2022    12:00 PM   --   I have the following health issues associated with obesity High Cholesterol   I have the following symptoms associated with obesity Back Pain    Fatigue       Assessment & Plan   Problem List Items Addressed This Visit       Dyslipidemia    History of morbid obesity - Primary      Plan  Will unfortunately likely have to stop wegovy due to insurance ceasing coverage of this medication in .  Start Metformin 500mg - take 1 tablet with a meal once daily for 7 days, then increase to 2 tablets with a meal once daily.   Start phentermine 1/2 tab each morning   Can take remaining wegovy doses every other week if desired  Labs ordered in August, you will need to schedule those   Focus on maintaining protein, hydration, and fiber to help manage hunger while stopping the wegovy   Follow up with Marialuisa in 3 months     PHENTERMINE  No history of high blood pressure, no CV disease, no history of glaucoma  Has taken in the past experienced minor insomnia but would be alright with restarting this medication  On birth control  has vasectomy   .  METFORMIN  No history of chronic kidney disease  GFR >45  May be helpful in replacing the insulin sensitivity effects that Wegovy was having   .      INTERVAL HISTORY:  New [facility] 2022- BMI 29 with HLD and back pain. Starting BMI of 33 with OB 2022.  Started wegovy. 2023 started tapering up to 1.7mg, increasing to 2.4mg 2023.   On 10/05/23 was doing well on 2.4mg dose.      Was 130-140 where she was prebaby- would like to get to there     Wt Readings from Last 5 Encounters:   12/15/23 64.4 kg (142 lb)   10/05/23 68 kg (150 lb)   23  71 kg (156 lb 8 oz)   02/23/23 78.5 kg (173 lb)   12/30/22 81.6 kg (180 lb)       Anti-obesity medication history    Current:   Wegovy 2.4  Has seen really successful weight loss with minimal side effects. But insurance will not be covering it in 2024.    Past/Failed/contraindicated:   Phentermine: insomnia but would be open to restarting this.     Vitamins/Labs: Labs ordered     Pregnancy:  has vasectomy     CURRENT WEIGHT:   142 lbs 0 oz    Initial Weight (lbs): 199 lbs  Last Visits Weight: 68 kg (150 lb)  Cumulative weight loss (lbs): 57  Weight Loss Percentage: 28.64%        12/12/2023     7:31 PM   Changes and Difficulties   I have made the following changes to my diet since my last visit: eating less fats, drinking less pop   With regards to my diet, I am still struggling with: none   I have made the following changes to my activity/exercise since my last visit: increased activity   With regards to my activity/exercise, I am still struggling with: n/a             MEDICATIONS:   Current Outpatient Medications   Medication Sig Dispense Refill    Semaglutide-Weight Management (WEGOVY) 2.4 MG/0.75ML pen Inject 2.4 mg Subcutaneous once a week 3 mL 3    diazepam (VALIUM) 10 MG tablet Take 1 tablet (10 mg) by mouth every 6 hours as needed for anxiety (Patient not taking: Reported on 12/15/2023) 1 tablet 0    gabapentin (NEURONTIN) 300 MG capsule Take 1 capsule (300 mg) by mouth nightly as needed for neuropathic pain (Patient not taking: Reported on 12/15/2023) 30 capsule 1    hydrOXYzine (ATARAX) 25 MG tablet Take 1 tablet (25 mg) by mouth 2 times daily as needed for itching (Patient not taking: Reported on 12/15/2023) 30 tablet 1    Semaglutide-Weight Management (WEGOVY) 1.7 MG/0.75ML pen Inject 1.7 mg Subcutaneous every 7 days (Patient not taking: Reported on 12/15/2023) 3 mL 0    zolpidem (AMBIEN) 5 MG tablet TAKE 1 TABLET BY MOUTH NIGHTLY AS NEEDED FOR SLEEP (Patient not taking: Reported on 12/15/2023) 30  "tablet 0           12/12/2023     7:31 PM   Weight Loss Medication History Reviewed With Patient   Which weight loss medications are you currently taking on a regular basis? Wegovy   Are you having any side effects from the weight loss medication that we have prescribed you? No            No data to display                  PHYSICAL EXAM:  Objective    Ht 1.651 m (5' 5\")   Wt 64.4 kg (142 lb)   BMI 23.63 kg/m      Vitals - Patient Reported  Pain Score: No Pain (0)      Vitals:  No vitals were obtained today due to virtual visit.    GENERAL: Healthy, alert and no distress  EYES: Eyes grossly normal to inspection.  No discharge or erythema, or obvious scleral/conjunctival abnormalities.  RESP: No audible wheeze, cough, or visible cyanosis.  No visible retractions or increased work of breathing.    SKIN: Visible skin clear. No significant rash, abnormal pigmentation or lesions.  NEURO: Cranial nerves grossly intact.  Mentation and speech appropriate for age.  PSYCH: Mentation appears normal, affect normal/bright, judgement and insight intact, normal speech and appearance well-groomed.        Sincerely,    Dayanna Salomon PA-C      35 minutes spent by me on the date of the encounter doing chart review, history and exam, documentation and further activities per the note   "

## 2023-12-15 NOTE — NURSING NOTE
Is the patient currently in the state of [place]? YES    Visit mode:VIDEO    If the visit is dropped, the patient can be reconnected by: VIDEO VISIT: Text to cell phone:   Telephone Information:   Mobile [phone]       Will anyone else be joining the visit? NO  (If patient encounters technical issues they should call [phone])    How would you like to obtain your AVS? MyChart    Are changes needed to the allergy or medication list? No    Reason for visit: RECHECK    Yves FAY

## 2024-05-08 ENCOUNTER — TELEPHONE (OUTPATIENT)
Dept: ENDOCRINOLOGY | Facility: CLINIC | Age: 39
End: 2024-05-08
Payer: COMMERCIAL

## 2024-05-08 NOTE — TELEPHONE ENCOUNTER
PA is due to  for Wegovy. Is patient still taking?  Will need current weight, BMI if PA renewal is needed

## 2024-05-10 NOTE — TELEPHONE ENCOUNTER
Follow up. PA is due to  for Wegovy. Is pt will taking?   Will need current weight, BMI if PA renewal is needed

## 2024-05-10 NOTE — TELEPHONE ENCOUNTER
[employer] message sent to patient to see if she is still taking Wegovy. Will need current weight to submit PA.

## 2024-10-29 ENCOUNTER — OFFICE VISIT (OUTPATIENT)
Dept: FAMILY MEDICINE | Facility: CLINIC | Age: 39
End: 2024-10-29
Payer: COMMERCIAL

## 2024-10-29 VITALS
RESPIRATION RATE: 16 BRPM | BODY MASS INDEX: 25.64 KG/M2 | OXYGEN SATURATION: 99 % | SYSTOLIC BLOOD PRESSURE: 112 MMHG | WEIGHT: 153.9 LBS | HEIGHT: 65 IN | TEMPERATURE: 98 F | HEART RATE: 74 BPM | DIASTOLIC BLOOD PRESSURE: 77 MMHG

## 2024-10-29 DIAGNOSIS — M54.2 ACUTE NECK PAIN: ICD-10-CM

## 2024-10-29 DIAGNOSIS — Z00.00 ROUTINE GENERAL MEDICAL EXAMINATION AT A HEALTH CARE FACILITY: Primary | ICD-10-CM

## 2024-10-29 PROCEDURE — 90480 ADMN SARSCOV2 VAC 1/ONLY CMP: CPT | Performed by: GENERAL PRACTICE

## 2024-10-29 PROCEDURE — 80053 COMPREHEN METABOLIC PANEL: CPT | Performed by: GENERAL PRACTICE

## 2024-10-29 PROCEDURE — 80061 LIPID PANEL: CPT | Performed by: GENERAL PRACTICE

## 2024-10-29 PROCEDURE — 87340 HEPATITIS B SURFACE AG IA: CPT | Performed by: GENERAL PRACTICE

## 2024-10-29 PROCEDURE — 86704 HEP B CORE ANTIBODY TOTAL: CPT | Performed by: GENERAL PRACTICE

## 2024-10-29 PROCEDURE — 91320 SARSCV2 VAC 30MCG TRS-SUC IM: CPT | Performed by: GENERAL PRACTICE

## 2024-10-29 PROCEDURE — 36415 COLL VENOUS BLD VENIPUNCTURE: CPT | Performed by: GENERAL PRACTICE

## 2024-10-29 PROCEDURE — 99385 PREV VISIT NEW AGE 18-39: CPT | Mod: 25 | Performed by: GENERAL PRACTICE

## 2024-10-29 PROCEDURE — 86706 HEP B SURFACE ANTIBODY: CPT | Performed by: GENERAL PRACTICE

## 2024-10-29 SDOH — HEALTH STABILITY: PHYSICAL HEALTH: ON AVERAGE, HOW MANY DAYS PER WEEK DO YOU ENGAGE IN MODERATE TO STRENUOUS EXERCISE (LIKE A BRISK WALK)?: 3 DAYS

## 2024-10-29 SDOH — HEALTH STABILITY: PHYSICAL HEALTH: ON AVERAGE, HOW MANY MINUTES DO YOU ENGAGE IN EXERCISE AT THIS LEVEL?: 20 MIN

## 2024-10-29 ASSESSMENT — PATIENT HEALTH QUESTIONNAIRE - PHQ9
SUM OF ALL RESPONSES TO PHQ QUESTIONS 1-9: 0
SUM OF ALL RESPONSES TO PHQ QUESTIONS 1-9: 0
10. IF YOU CHECKED OFF ANY PROBLEMS, HOW DIFFICULT HAVE THESE PROBLEMS MADE IT FOR YOU TO DO YOUR WORK, TAKE CARE OF THINGS AT HOME, OR GET ALONG WITH OTHER PEOPLE: NOT DIFFICULT AT ALL

## 2024-10-29 ASSESSMENT — PAIN SCALES - GENERAL: PAINLEVEL_OUTOF10: SEVERE PAIN (6)

## 2024-10-29 ASSESSMENT — SOCIAL DETERMINANTS OF HEALTH (SDOH): HOW OFTEN DO YOU GET TOGETHER WITH FRIENDS OR RELATIVES?: ONCE A WEEK

## 2024-10-29 NOTE — NURSING NOTE
"Chief Complaint   Patient presents with    Physical     NO PAP     Initial /77 (BP Location: Right arm, Patient Position: Sitting, Cuff Size: Adult Regular)   Pulse 74   Temp 98  F (36.7  C) (Oral)   Resp 16   Ht 1.645 m (5' 4.75\")   Wt 69.8 kg (153 lb 14.4 oz)   LMP  (LMP Unknown)   SpO2 99%   BMI 25.81 kg/m   Estimated body mass index is 25.81 kg/m  as calculated from the following:    Height as of this encounter: 1.645 m (5' 4.75\").    Weight as of this encounter: 69.8 kg (153 lb 14.4 oz).  BP completed using cuff size regular right arm    Lisa Magill, CMA    "

## 2024-10-29 NOTE — PROGRESS NOTES
"Preventive Care Visit  [facility] RAMA Mitchell MD, Internal Medicine  Oct 29, 2024      Assessment & Plan     Routine general medical examination at a health care facility  UTD with pap   - Comprehensive metabolic panel (BMP + Alb, Alk Phos, ALT, AST, Total. Bili, TP); Future  - Hepatitis B core antibody; Future  - Hepatitis B Surface Antibody; Future  - Hepatitis B surface antigen; Future  - Lipid panel reflex to direct LDL Non-fasting; Future  - Comprehensive metabolic panel (BMP + Alb, Alk Phos, ALT, AST, Total. Bili, TP)  - Hepatitis B core antibody  - Hepatitis B Surface Antibody  - Hepatitis B surface antigen  - Lipid panel reflex to direct LDL Non-fasting    Acute neck pain  Conservative management  Follow-up if no improvement in 2-3 months.             BMI  Estimated body mass index is 25.81 kg/m  as calculated from the following:    Height as of this encounter: 1.645 m (5' 4.75\").    Weight as of this encounter: 69.8 kg (153 lb 14.4 oz).       Counseling  Appropriate preventive services were addressed with this patient via screening, questionnaire, or discussion as appropriate for fall prevention, nutrition, physical activity, Tobacco-use cessation, social engagement, weight loss and cognition.  Checklist reviewing preventive services available has been given to the patient.          Yuliana Kaplan is a 39 year old, presenting for the following:  Physical (NO PAP)        10/29/2024     4:00 PM   Additional Questions   Roomed by Lisa Magill, CMA   Accompanied by self         10/29/2024     4:00 PM   Patient Reported Additional Medications   Patient reports taking the following new medications NONE          Physical Exam    On/off neck swelling, left neck  Duration for months  Last for a couple weeks    Neck pain  For the past couple of weeks  Pain with turning to the right  Tylenol helps                Health Care Directive  Patient does not have a Health Care Directive: " Discussed advance care planning with patient; however, patient declined at this time.      10/29/2024   General Health   How would you rate your overall physical health? Good   Feel stress (tense, anxious, or unable to sleep) Rather much      (!) STRESS CONCERN      10/29/2024   Nutrition   Three or more servings of calcium each day? Yes   Diet: Regular (no restrictions)   How many servings of fruit and vegetables per day? (!) 2-3   How many sweetened beverages each day? 0-1            10/29/2024   Exercise   Days per week of moderate/strenous exercise 3 days   Average minutes spent exercising at this level 20 min            10/29/2024   Social Factors   Frequency of gathering with friends or relatives Once a week   Worry food won't last until get money to buy more No   Food not last or not have enough money for food? No   Do you have housing? (Housing is defined as stable permanent housing and does not include staying ouside in a car, in a tent, in an abandoned building, in an overnight shelter, or couch-surfing.) Yes   Are you worried about losing your housing? No   Lack of transportation? No   Unable to get utilities (heat,electricity)? No            10/29/2024   Dental   Dentist two times every year? Yes             Today's PHQ-9 Score:       10/29/2024     8:34 AM   PHQ-9 SCORE   PHQ-9 Total Score MyChart 0   PHQ-9 Total Score 0        Patient-reported         10/29/2024   Substance Use   Alcohol more than 3/day or more than 7/wk No   Do you use any other substances recreationally? No        Social History     Tobacco Use    Smoking status: Never     Passive exposure: Never    Smokeless tobacco: Never   Vaping Use    Vaping status: Never Used   Substance Use Topics    Alcohol use: Yes     Comment: occasionally    Drug use: Never           8/8/2022   LAST FHS-7 RESULTS   1st degree relative breast or ovarian cancer No    Any relative bilateral breast cancer Yes    Any male have breast cancer No    Any ONE woman  "have BOTH breast AND ovarian cancer Yes    Any woman with breast cancer before 50yrs No    2 or more relatives with breast AND/OR ovarian cancer No    2 or more relatives with breast AND/OR bowel cancer No        Patient-reported                10/29/2024   STI Screening   New sexual partner(s) since last STI/HIV test? No        History of abnormal Pap smear:         Latest Ref Rng & Units 9/19/2022    11:35 AM   PAP / HPV   PAP  Negative for Intraepithelial Lesion or Malignancy (NILM)    HPV 16 DNA Negative Negative    HPV 18 DNA Negative Negative    Other HR HPV Negative Negative            10/29/2024   Contraception/Family Planning   Questions about contraception or family planning No           Reviewed and updated as needed this visit by Provider                          Review of Systems  Constitutional, HEENT, cardiovascular, pulmonary, GI, , musculoskeletal, neuro, skin, endocrine and psych systems are negative, except as otherwise noted.     Objective    Exam  /77 (BP Location: Right arm, Patient Position: Sitting, Cuff Size: Adult Regular)   Pulse 74   Temp 98  F (36.7  C) (Oral)   Resp 16   Ht 1.645 m (5' 4.75\")   Wt 69.8 kg (153 lb 14.4 oz)   LMP  (LMP Unknown)   SpO2 99%   BMI 25.81 kg/m     Estimated body mass index is 25.81 kg/m  as calculated from the following:    Height as of this encounter: 1.645 m (5' 4.75\").    Weight as of this encounter: 69.8 kg (153 lb 14.4 oz).    Physical Exam  Constitutional:       Appearance: Normal appearance.   HENT:      Head: Normocephalic and atraumatic.      Right Ear: Tympanic membrane normal.      Left Ear: Tympanic membrane normal.      Nose: Nose normal.      Mouth/Throat:      Mouth: Mucous membranes are moist.      Pharynx: Oropharynx is clear.   Eyes:      Extraocular Movements: Extraocular movements intact.      Conjunctiva/sclera: Conjunctivae normal.   Cardiovascular:      Rate and Rhythm: Normal rate and regular rhythm.      Heart sounds: " Normal heart sounds.   Pulmonary:      Effort: Pulmonary effort is normal.      Breath sounds: Normal breath sounds.   Abdominal:      General: Abdomen is flat.      Palpations: Abdomen is soft.   Musculoskeletal:         General: Normal range of motion.      Cervical back: Normal range of motion and neck supple.   Skin:     General: Skin is warm.   Neurological:      General: No focal deficit present.      Mental Status: She is alert and oriented to person, place, and time. Mental status is at baseline.   Psychiatric:         Mood and Affect: Mood and affect normal.         Speech: Speech normal.         Behavior: Behavior normal. Behavior is cooperative.         Thought Content: Thought content normal.         Cognition and Memory: Cognition normal.         Judgment: Judgment normal.               Signed Electronically by: Agnieszka Mitchell MD

## 2024-10-29 NOTE — PATIENT INSTRUCTIONS
Patient Education   Preventive Care Advice   This is general advice given by our system to help you stay healthy. However, your care team may have specific advice just for you. Please talk to your care team about your preventive care needs.  Nutrition  Eat 5 or more servings of fruits and vegetables each day.  Try wheat bread, brown rice and whole grain pasta (instead of white bread, rice, and pasta).  Get enough calcium and vitamin D. Check the label on foods and aim for 100% of the RDA (recommended daily allowance).  Lifestyle  Exercise at least 150 minutes each week  (30 minutes a day, 5 days a week).  Do muscle strengthening activities 2 days a week. These help control your weight and prevent disease.  No smoking.  Wear sunscreen to prevent skin cancer.  Have a dental exam and cleaning every 6 months.  Yearly exams  See your health care team every year to talk about:  Any changes in your health.  Any medicines your care team has prescribed.  Preventive care, family planning, and ways to prevent chronic diseases.  Shots (vaccines)   HPV shots (up to age 26), if you've never had them before.  Hepatitis B shots (up to age 59), if you've never had them before.  COVID-19 shot: Get this shot when it's due.  Flu shot: Get a flu shot every year.  Tetanus shot: Get a tetanus shot every 10 years.  Pneumococcal, hepatitis A, and RSV shots: Ask your care team if you need these based on your risk.  Shingles shot (for age 50 and up)  General health tests  Diabetes screening:  Starting at age 35, Get screened for diabetes at least every 3 years.  If you are younger than age 35, ask your care team if you should be screened for diabetes.  Cholesterol test: At age 39, start having a cholesterol test every 5 years, or more often if advised.  Bone density scan (DEXA): At age 50, ask your care team if you should have this scan for osteoporosis (brittle bones).  Hepatitis C: Get tested at least once in your life.  STIs (sexually  transmitted infections)  Before age 24: Ask your care team if you should be screened for STIs.  After age 24: Get screened for STIs if you're at risk. You are at risk for STIs (including HIV) if:  You are sexually active with more than one person.  You don't use condoms every time.  You or a partner was diagnosed with a sexually transmitted infection.  If you are at risk for HIV, ask about PrEP medicine to prevent HIV.  Get tested for HIV at least once in your life, whether you are at risk for HIV or not.  Cancer screening tests  Cervical cancer screening: If you have a cervix, begin getting regular cervical cancer screening tests starting at age 21.  Breast cancer scan (mammogram): If you've ever had breasts, begin having regular mammograms starting at age 40. This is a scan to check for breast cancer.  Colon cancer screening: It is important to start screening for colon cancer at age 45.  Have a colonoscopy test every 10 years (or more often if you're at risk) Or, ask your provider about stool tests like a FIT test every year or Cologuard test every 3 years.  To learn more about your testing options, visit:   .  For help making a decision, visit:   https://bit.ly/jy00823.  Prostate cancer screening test: If you have a prostate, ask your care team if a prostate cancer screening test (PSA) at age 55 is right for you.  Lung cancer screening: If you are a current or former smoker ages 50 to 80, ask your care team if ongoing lung cancer screenings are right for you.  For informational purposes only. Not to replace the advice of your health care provider. Copyright   2023 [facility]. All rights reserved. Clinically reviewed by the [facility]. [place] 595860 - REV 01/24.  Learning About Stress  What is stress?     Stress is your body's response to a hard situation. Your body can have a physical, emotional, or mental response. Stress is a fact of life for most people, and it  affects everyone differently. What causes stress for you may not be stressful for someone else.  A lot of things can cause stress. You may feel stress when you go on a job interview, take a test, or run a race. This kind of short-term stress is normal and even useful. It can help you if you need to work hard or react quickly. For example, stress can help you finish an important job on time.  Long-term stress is caused by ongoing stressful situations or events. Examples of long-term stress include long-term health problems, ongoing problems at work, or conflicts in your family. Long-term stress can harm your health.  How does stress affect your health?  When you are stressed, your body responds as though you are in danger. It makes hormones that speed up your heart, make you breathe faster, and give you a burst of energy. This is called the fight-or-flight stress response. If the stress is over quickly, your body goes back to normal and no harm is done.  But if stress happens too often or lasts too long, it can have bad effects. Long-term stress can make you more likely to get sick, and it can make symptoms of some diseases worse. If you tense up when you are stressed, you may develop neck, shoulder, or low back pain. Stress is linked to high blood pressure and heart disease.  Stress also harms your emotional health. It can make you capellan, tense, or depressed. Your relationships may suffer, and you may not do well at work or school.  What can you do to manage stress?  You can try these things to help manage stress:   Do something active. Exercise or activity can help reduce stress. Walking is a great way to get started. Even everyday activities such as housecleaning or yard work can help.  Try yoga or sisi chi. These techniques combine exercise and meditation. You may need some training at first to learn them.  Do something you enjoy. For example, listen to music or go to a movie. Practice your hobby or do volunteer  "work.  Meditate. This can help you relax, because you are not worrying about what happened before or what may happen in the future.  Do guided imagery. Imagine yourself in any setting that helps you feel calm. You can use online videos, books, or a teacher to guide you.  Do breathing exercises. For example:  From a standing position, bend forward from the waist with your knees slightly bent. Let your arms dangle close to the floor.  Breathe in slowly and deeply as you return to a standing position. Roll up slowly and lift your head last.  Hold your breath for just a few seconds in the standing position.  Breathe out slowly and bend forward from the waist.  Let your feelings out. Talk, laugh, cry, and express anger when you need to. Talking with supportive friends or family, a counselor, or a yancy leader about your feelings is a healthy way to relieve stress. Avoid discussing your feelings with people who make you feel worse.  Write. It may help to write about things that are bothering you. This helps you find out how much stress you feel and what is causing it. When you know this, you can find better ways to cope.  What can you do to prevent stress?  You might try some of these things to help prevent stress:  Manage your time. This helps you find time to do the things you want and need to do.  Get enough sleep. Your body recovers from the stresses of the day while you are sleeping.  Get support. Your family, friends, and community can make a difference in how you experience stress.  Limit your news feed. Avoid or limit time on social media or news that may make you feel stressed.  Do something active. Exercise or activity can help reduce stress. Walking is a great way to get started.  Where can you learn more?  Go to https://www.ArmedZilla.net/patiented  Enter N032 in the search box to learn more about \"Learning About Stress.\"  Current as of: October 24, 2023  Content Version: 14.2 2024 [employer]. "   Care instructions adapted under license by your healthcare professional. If you have questions about a medical condition or this instruction, always ask your healthcare professional. [employer] disclaims any warranty or liability for your use of this information.

## 2024-10-30 LAB
ALBUMIN SERPL BCG-MCNC: 4.6 G/DL (ref 3.5–5.2)
ALP SERPL-CCNC: 38 U/L (ref 40–150)
ALT SERPL W P-5'-P-CCNC: 14 U/L (ref 0–50)
ANION GAP SERPL CALCULATED.3IONS-SCNC: 12 MMOL/L (ref 7–15)
AST SERPL W P-5'-P-CCNC: 18 U/L (ref 0–45)
BILIRUB SERPL-MCNC: 0.4 MG/DL
BUN SERPL-MCNC: 13.3 MG/DL (ref 6–20)
CALCIUM SERPL-MCNC: 9.5 MG/DL (ref 8.8–10.4)
CHLORIDE SERPL-SCNC: 103 MMOL/L (ref 98–107)
CHOLEST SERPL-MCNC: 210 MG/DL
CREAT SERPL-MCNC: 0.85 MG/DL (ref 0.51–0.95)
EGFRCR SERPLBLD CKD-EPI 2021: 89 ML/MIN/1.73M2
FASTING STATUS PATIENT QL REPORTED: NO
FASTING STATUS PATIENT QL REPORTED: NO
GLUCOSE SERPL-MCNC: 80 MG/DL (ref 70–99)
HBV CORE AB SERPL QL IA: NONREACTIVE
HBV SURFACE AB SERPL IA-ACNC: 365 M[IU]/ML
HBV SURFACE AB SERPL IA-ACNC: REACTIVE M[IU]/ML
HBV SURFACE AG SERPL QL IA: NONREACTIVE
HCO3 SERPL-SCNC: 25 MMOL/L (ref 22–29)
HDLC SERPL-MCNC: 62 MG/DL
LDLC SERPL CALC-MCNC: 129 MG/DL
NONHDLC SERPL-MCNC: 148 MG/DL
POTASSIUM SERPL-SCNC: 4.3 MMOL/L (ref 3.4–5.3)
PROT SERPL-MCNC: 7.3 G/DL (ref 6.4–8.3)
SODIUM SERPL-SCNC: 140 MMOL/L (ref 135–145)
TRIGL SERPL-MCNC: 94 MG/DL

## 2024-12-24 ENCOUNTER — E-VISIT (OUTPATIENT)
Dept: URGENT CARE | Facility: CLINIC | Age: 39
End: 2024-12-24
Payer: COMMERCIAL

## 2024-12-24 DIAGNOSIS — H57.89 REDNESS OF EYE, RIGHT: Primary | ICD-10-CM

## 2024-12-24 RX ORDER — POLYMYXIN B SULFATE AND TRIMETHOPRIM 1; 10000 MG/ML; [USP'U]/ML
SOLUTION OPHTHALMIC
Qty: 10 ML | Refills: 0 | Status: SHIPPED | OUTPATIENT
Start: 2024-12-24 | End: 2024-12-31

## 2024-12-24 NOTE — PATIENT INSTRUCTIONS
Thank you for choosing us for your care. I have placed an order for a prescription so that you can start treatment. View your full visit summary for details by clicking on the link below. Your pharmacist will able to address any questions you may have about the medication.     If you re not feeling better within 2-3 days, please schedule an appointment.  You can schedule an appointment right here in [facility], or call [phone]  If the visit is for the same symptoms as your eVisit, we ll refund the cost of your eVisit if seen within seven days.    Pinkeye: Care Instructions  Overview     Pinkeye is redness and swelling of the eye surface and the conjunctiva (the lining of the eyelid and the covering of the white part of the eye). Pinkeye is also called conjunctivitis. Pinkeye is often caused by infection with bacteria or a virus. Dry air, allergies, smoke, and chemicals are other common causes.  Pinkeye often gets better on its own in 7 to 10 days. Antibiotics only help if the pinkeye is caused by bacteria. Pinkeye caused by infection spreads easily. If an allergy or chemical is causing pinkeye, it will not go away unless you can avoid whatever is causing it.  Follow-up care is a key part of your treatment and safety. Be sure to make and go to all appointments, and call your doctor if you are having problems. It's also a good idea to know your test results and keep a list of the medicines you take.  How can you care for yourself at home?  Wash your hands often. Always wash them before and after you treat pinkeye or touch your eyes or face.  Use moist cotton or a clean, wet cloth to remove crust. Wipe from the inside corner of the eye to the outside. Use a clean part of the cloth for each wipe.  Put cold or warm wet cloths on your eye a few times a day if the eye hurts.  Do not wear contact lenses or eye makeup until the pinkeye is gone. Throw away any eye makeup you were using when you got pinkeye. Clean your  "contacts and storage case. If you wear disposable contacts, use a new pair when your eye has cleared and it is safe to wear contacts again.  If the doctor gave you antibiotic ointment or eyedrops, use them as directed. Use the medicine for as long as instructed, even if your eye starts looking better soon. Keep the bottle tip clean, and do not let it touch the eye area.  To put in eyedrops or ointment:  Tilt your head back, and pull your lower eyelid down with one finger.  Drop or squirt the medicine inside the lower lid.  Close your eye for 30 to 60 seconds to let the drops or ointment move around.  Do not touch the ointment or dropper tip to your eyelashes or any other surface.  Do not share towels, pillows, or washcloths while you have pinkeye.  When should you call for help?   Call your doctor now or seek immediate medical care if:    You have pain in your eye, not just irritation on the surface.     You have a change in vision or loss of vision.     You have an increase in discharge from the eye.     Your eye has not started to improve or begins to get worse within 48 hours after you start using antibiotics.     Pinkeye lasts longer than 7 days.   Watch closely for changes in your health, and be sure to contact your doctor if you have any problems.  Where can you learn more?  Go to https://www.Ketchuppp.net/patiented  Enter Y392 in the search box to learn more about \"Pinkeye: Care Instructions.\"  Current as of: July 31, 2024  Content Version: 14.3    2024 [employer].   Care instructions adapted under license by your healthcare professional. If you have questions about a medical condition or this instruction, always ask your healthcare professional. [employer] disclaims any warranty or liability for your use of this information.    "